# Patient Record
Sex: FEMALE | Race: WHITE | NOT HISPANIC OR LATINO
[De-identification: names, ages, dates, MRNs, and addresses within clinical notes are randomized per-mention and may not be internally consistent; named-entity substitution may affect disease eponyms.]

---

## 2017-01-03 ENCOUNTER — RX RENEWAL (OUTPATIENT)
Age: 57
End: 2017-01-03

## 2017-01-05 ENCOUNTER — NON-APPOINTMENT (OUTPATIENT)
Age: 57
End: 2017-01-05

## 2017-01-05 ENCOUNTER — APPOINTMENT (OUTPATIENT)
Dept: CARDIOLOGY | Facility: CLINIC | Age: 57
End: 2017-01-05

## 2017-01-05 VITALS — SYSTOLIC BLOOD PRESSURE: 174 MMHG | DIASTOLIC BLOOD PRESSURE: 100 MMHG

## 2017-01-05 VITALS
OXYGEN SATURATION: 95 % | HEIGHT: 63 IN | SYSTOLIC BLOOD PRESSURE: 145 MMHG | DIASTOLIC BLOOD PRESSURE: 80 MMHG | WEIGHT: 202 LBS | BODY MASS INDEX: 35.79 KG/M2 | HEART RATE: 83 BPM

## 2017-01-20 ENCOUNTER — APPOINTMENT (OUTPATIENT)
Dept: CARDIOLOGY | Facility: CLINIC | Age: 57
End: 2017-01-20

## 2017-01-23 ENCOUNTER — APPOINTMENT (OUTPATIENT)
Dept: CARDIOLOGY | Facility: CLINIC | Age: 57
End: 2017-01-23

## 2017-02-09 ENCOUNTER — APPOINTMENT (OUTPATIENT)
Dept: CARDIOLOGY | Facility: CLINIC | Age: 57
End: 2017-02-09

## 2017-06-18 ENCOUNTER — EMERGENCY (EMERGENCY)
Facility: HOSPITAL | Age: 57
LOS: 1 days | Discharge: HOME | End: 2017-06-18
Attending: EMERGENCY MEDICINE | Admitting: EMERGENCY MEDICINE
Payer: COMMERCIAL

## 2017-06-18 VITALS
HEART RATE: 103 BPM | WEIGHT: 197.09 LBS | TEMPERATURE: 98 F | DIASTOLIC BLOOD PRESSURE: 105 MMHG | SYSTOLIC BLOOD PRESSURE: 146 MMHG | RESPIRATION RATE: 18 BRPM | OXYGEN SATURATION: 99 % | HEIGHT: 63 IN

## 2017-06-18 VITALS
DIASTOLIC BLOOD PRESSURE: 71 MMHG | HEART RATE: 84 BPM | OXYGEN SATURATION: 100 % | RESPIRATION RATE: 16 BRPM | SYSTOLIC BLOOD PRESSURE: 142 MMHG

## 2017-06-18 DIAGNOSIS — E89.0 POSTPROCEDURAL HYPOTHYROIDISM: Chronic | ICD-10-CM

## 2017-06-18 DIAGNOSIS — Z98.891 HISTORY OF UTERINE SCAR FROM PREVIOUS SURGERY: Chronic | ICD-10-CM

## 2017-06-18 DIAGNOSIS — Z95.1 PRESENCE OF AORTOCORONARY BYPASS GRAFT: Chronic | ICD-10-CM

## 2017-06-18 LAB
ALBUMIN SERPL ELPH-MCNC: 4.3 G/DL — SIGNIFICANT CHANGE UP (ref 3.3–5)
ALP SERPL-CCNC: 98 U/L — SIGNIFICANT CHANGE UP (ref 30–120)
ALT FLD-CCNC: 53 U/L DA — SIGNIFICANT CHANGE UP (ref 10–60)
ANION GAP SERPL CALC-SCNC: 7 MMOL/L — SIGNIFICANT CHANGE UP (ref 5–17)
APPEARANCE UR: ABNORMAL
AST SERPL-CCNC: 22 U/L — SIGNIFICANT CHANGE UP (ref 10–40)
BASOPHILS # BLD AUTO: 0.1 K/UL — SIGNIFICANT CHANGE UP (ref 0–0.2)
BASOPHILS NFR BLD AUTO: 1.1 % — SIGNIFICANT CHANGE UP (ref 0–2)
BILIRUB SERPL-MCNC: 0.3 MG/DL — SIGNIFICANT CHANGE UP (ref 0.2–1.2)
BILIRUB UR-MCNC: NEGATIVE — SIGNIFICANT CHANGE UP
BUN SERPL-MCNC: 19 MG/DL — SIGNIFICANT CHANGE UP (ref 7–23)
CALCIUM SERPL-MCNC: 9.6 MG/DL — SIGNIFICANT CHANGE UP (ref 8.4–10.5)
CHLORIDE SERPL-SCNC: 104 MMOL/L — SIGNIFICANT CHANGE UP (ref 96–108)
CO2 SERPL-SCNC: 30 MMOL/L — SIGNIFICANT CHANGE UP (ref 22–31)
COLOR SPEC: YELLOW — SIGNIFICANT CHANGE UP
CREAT SERPL-MCNC: 1.01 MG/DL — SIGNIFICANT CHANGE UP (ref 0.5–1.3)
DIFF PNL FLD: ABNORMAL
EOSINOPHIL # BLD AUTO: 0.4 K/UL — SIGNIFICANT CHANGE UP (ref 0–0.5)
EOSINOPHIL NFR BLD AUTO: 3.5 % — SIGNIFICANT CHANGE UP (ref 0–6)
GLUCOSE SERPL-MCNC: 133 MG/DL — HIGH (ref 70–99)
GLUCOSE UR QL: NEGATIVE MG/DL — SIGNIFICANT CHANGE UP
HCT VFR BLD CALC: 46.3 % — HIGH (ref 34.5–45)
HGB BLD-MCNC: 17.6 G/DL — HIGH (ref 11.5–15.5)
KETONES UR-MCNC: NEGATIVE — SIGNIFICANT CHANGE UP
LEUKOCYTE ESTERASE UR-ACNC: ABNORMAL
LYMPHOCYTES # BLD AUTO: 29.4 % — SIGNIFICANT CHANGE UP (ref 13–44)
LYMPHOCYTES # BLD AUTO: 3.3 K/UL — SIGNIFICANT CHANGE UP (ref 1–3.3)
MCHC RBC-ENTMCNC: 34.8 PG — HIGH (ref 27–34)
MCHC RBC-ENTMCNC: 38 GM/DL — HIGH (ref 32–36)
MCV RBC AUTO: 91.4 FL — SIGNIFICANT CHANGE UP (ref 80–100)
MONOCYTES # BLD AUTO: 0.8 K/UL — SIGNIFICANT CHANGE UP (ref 0–0.9)
MONOCYTES NFR BLD AUTO: 6.9 % — SIGNIFICANT CHANGE UP (ref 2–14)
NEUTROPHILS # BLD AUTO: 6.7 K/UL — SIGNIFICANT CHANGE UP (ref 1.8–7.4)
NEUTROPHILS NFR BLD AUTO: 59.2 % — SIGNIFICANT CHANGE UP (ref 43–77)
NITRITE UR-MCNC: NEGATIVE — SIGNIFICANT CHANGE UP
PH UR: 5 — SIGNIFICANT CHANGE UP (ref 5–8)
PLATELET # BLD AUTO: 296 K/UL — SIGNIFICANT CHANGE UP (ref 150–400)
POTASSIUM SERPL-MCNC: 3.8 MMOL/L — SIGNIFICANT CHANGE UP (ref 3.5–5.3)
POTASSIUM SERPL-SCNC: 3.8 MMOL/L — SIGNIFICANT CHANGE UP (ref 3.5–5.3)
PROT SERPL-MCNC: 8 G/DL — SIGNIFICANT CHANGE UP (ref 6–8.3)
PROT UR-MCNC: 15 MG/DL
RBC # BLD: 5.06 M/UL — SIGNIFICANT CHANGE UP (ref 3.8–5.2)
RBC # FLD: 12.2 % — SIGNIFICANT CHANGE UP (ref 10.3–14.5)
SODIUM SERPL-SCNC: 141 MMOL/L — SIGNIFICANT CHANGE UP (ref 135–145)
SP GR SPEC: 1.02 — SIGNIFICANT CHANGE UP (ref 1.01–1.02)
UROBILINOGEN FLD QL: NEGATIVE MG/DL — SIGNIFICANT CHANGE UP
WBC # BLD: 11.3 K/UL — HIGH (ref 3.8–10.5)
WBC # FLD AUTO: 11.3 K/UL — HIGH (ref 3.8–10.5)

## 2017-06-18 PROCEDURE — 99284 EMERGENCY DEPT VISIT MOD MDM: CPT

## 2017-06-18 PROCEDURE — 99284 EMERGENCY DEPT VISIT MOD MDM: CPT | Mod: 25

## 2017-06-18 PROCEDURE — 80053 COMPREHEN METABOLIC PANEL: CPT

## 2017-06-18 PROCEDURE — 81001 URINALYSIS AUTO W/SCOPE: CPT

## 2017-06-18 PROCEDURE — 87086 URINE CULTURE/COLONY COUNT: CPT

## 2017-06-18 PROCEDURE — 74176 CT ABD & PELVIS W/O CONTRAST: CPT | Mod: 26

## 2017-06-18 PROCEDURE — 74176 CT ABD & PELVIS W/O CONTRAST: CPT

## 2017-06-18 PROCEDURE — 96374 THER/PROPH/DIAG INJ IV PUSH: CPT

## 2017-06-18 PROCEDURE — 85027 COMPLETE CBC AUTOMATED: CPT

## 2017-06-18 RX ORDER — SODIUM CHLORIDE 9 MG/ML
1000 INJECTION INTRAMUSCULAR; INTRAVENOUS; SUBCUTANEOUS ONCE
Qty: 0 | Refills: 0 | Status: COMPLETED | OUTPATIENT
Start: 2017-06-18 | End: 2017-06-18

## 2017-06-18 RX ORDER — KETOROLAC TROMETHAMINE 30 MG/ML
15 SYRINGE (ML) INJECTION ONCE
Qty: 0 | Refills: 0 | Status: DISCONTINUED | OUTPATIENT
Start: 2017-06-18 | End: 2017-06-18

## 2017-06-18 RX ORDER — SODIUM CHLORIDE 9 MG/ML
3 INJECTION INTRAMUSCULAR; INTRAVENOUS; SUBCUTANEOUS ONCE
Qty: 0 | Refills: 0 | Status: COMPLETED | OUTPATIENT
Start: 2017-06-18 | End: 2017-06-18

## 2017-06-18 RX ORDER — ACETAMINOPHEN 500 MG
650 TABLET ORAL ONCE
Qty: 0 | Refills: 0 | Status: DISCONTINUED | OUTPATIENT
Start: 2017-06-18 | End: 2017-06-22

## 2017-06-18 RX ADMIN — SODIUM CHLORIDE 3 MILLILITER(S): 9 INJECTION INTRAMUSCULAR; INTRAVENOUS; SUBCUTANEOUS at 02:11

## 2017-06-18 RX ADMIN — SODIUM CHLORIDE 1000 MILLILITER(S): 9 INJECTION INTRAMUSCULAR; INTRAVENOUS; SUBCUTANEOUS at 03:33

## 2017-06-18 RX ADMIN — Medication 15 MILLIGRAM(S): at 02:41

## 2017-06-18 RX ADMIN — SODIUM CHLORIDE 1000 MILLILITER(S): 9 INJECTION INTRAMUSCULAR; INTRAVENOUS; SUBCUTANEOUS at 02:18

## 2017-06-18 RX ADMIN — Medication 15 MILLIGRAM(S): at 02:18

## 2017-06-18 NOTE — ED PROVIDER NOTE - CHPI ED SYMPTOMS NEG
no vomiting/no hematuria/no burning urination/no chills/no dysuria/no diarrhea/no nausea/no fever/no blood in stool

## 2017-06-18 NOTE — ED ADULT NURSE NOTE - OBJECTIVE STATEMENT
58YO female walked in ED, pt c/o right flank pain. "it started out of no where" sharp 10/10 pain sale.

## 2017-06-18 NOTE — ED PROVIDER NOTE - MEDICAL DECISION MAKING DETAILS
57 y.o. F with acute onset right upper quadrant/flank pain 1 hour ago - ivf, pain meds, ct - renal stone vs gb

## 2017-06-18 NOTE — ED PROVIDER NOTE - PROGRESS NOTE DETAILS
no pain, discussed labs with pt (has been told about high hb previously - smoker, doesn't keep hydrated) - discussed ct - possibly biliary colic - lfts normal - no pain - offered to keep pt until am for US GB or as pain has resolved, advised pt could f/u pcp or surg and have outpt US GB for further workup - advised avoiding fat/greasy/fried foods - pt agrees, wants to go home - advised pt/family to return immediately for any worsening

## 2017-06-18 NOTE — ED PROVIDER NOTE - OBJECTIVE STATEMENT
57 y.o. F with acute onset 1 hr ago right flank pain, waxing and waning, severe, no nausea, no fever, no change urination, last BM yesterday morning, normal, last PO 3 hrs ago chicken/bread 57 y.o. F with acute onset 1 hr ago right flank pain, waxing and waning, severe, no nausea, no fever, no change urination, last BM yesterday morning, normal, last PO 3 hrs ago chicken with tahini and bread. took 1 motrin just pta, never had pain like this before

## 2017-06-19 LAB
CULTURE RESULTS: SIGNIFICANT CHANGE UP
SPECIMEN SOURCE: SIGNIFICANT CHANGE UP

## 2017-06-22 DIAGNOSIS — R10.9 UNSPECIFIED ABDOMINAL PAIN: ICD-10-CM

## 2017-12-20 ENCOUNTER — RX RENEWAL (OUTPATIENT)
Age: 57
End: 2017-12-20

## 2017-12-27 ENCOUNTER — EMERGENCY (EMERGENCY)
Facility: HOSPITAL | Age: 57
LOS: 1 days | Discharge: HOME | End: 2017-12-27
Attending: EMERGENCY MEDICINE | Admitting: EMERGENCY MEDICINE
Payer: COMMERCIAL

## 2017-12-27 VITALS
TEMPERATURE: 98 F | DIASTOLIC BLOOD PRESSURE: 86 MMHG | RESPIRATION RATE: 16 BRPM | OXYGEN SATURATION: 97 % | HEART RATE: 91 BPM | SYSTOLIC BLOOD PRESSURE: 143 MMHG

## 2017-12-27 VITALS
HEIGHT: 63 IN | SYSTOLIC BLOOD PRESSURE: 150 MMHG | TEMPERATURE: 97 F | OXYGEN SATURATION: 99 % | DIASTOLIC BLOOD PRESSURE: 83 MMHG | HEART RATE: 98 BPM | WEIGHT: 195.11 LBS | RESPIRATION RATE: 14 BRPM

## 2017-12-27 DIAGNOSIS — E89.0 POSTPROCEDURAL HYPOTHYROIDISM: Chronic | ICD-10-CM

## 2017-12-27 DIAGNOSIS — Z95.1 PRESENCE OF AORTOCORONARY BYPASS GRAFT: Chronic | ICD-10-CM

## 2017-12-27 DIAGNOSIS — Z98.891 HISTORY OF UTERINE SCAR FROM PREVIOUS SURGERY: Chronic | ICD-10-CM

## 2017-12-27 PROCEDURE — 73630 X-RAY EXAM OF FOOT: CPT

## 2017-12-27 PROCEDURE — 73630 X-RAY EXAM OF FOOT: CPT | Mod: 26,RT

## 2017-12-27 PROCEDURE — 99284 EMERGENCY DEPT VISIT MOD MDM: CPT | Mod: 25

## 2017-12-27 PROCEDURE — 70450 CT HEAD/BRAIN W/O DYE: CPT

## 2017-12-27 PROCEDURE — 70450 CT HEAD/BRAIN W/O DYE: CPT | Mod: 26

## 2017-12-27 PROCEDURE — 73610 X-RAY EXAM OF ANKLE: CPT

## 2017-12-27 PROCEDURE — 29515 APPLICATION SHORT LEG SPLINT: CPT | Mod: RT

## 2017-12-27 PROCEDURE — 29515 APPLICATION SHORT LEG SPLINT: CPT

## 2017-12-27 PROCEDURE — 73610 X-RAY EXAM OF ANKLE: CPT | Mod: 26,RT

## 2017-12-27 PROCEDURE — 99285 EMERGENCY DEPT VISIT HI MDM: CPT | Mod: 25

## 2017-12-27 RX ORDER — IBUPROFEN 200 MG
400 TABLET ORAL ONCE
Qty: 0 | Refills: 0 | Status: COMPLETED | OUTPATIENT
Start: 2017-12-27 | End: 2017-12-27

## 2017-12-27 RX ADMIN — Medication 400 MILLIGRAM(S): at 13:48

## 2017-12-27 RX ADMIN — Medication 400 MILLIGRAM(S): at 15:05

## 2017-12-27 NOTE — ED PROVIDER NOTE - PROGRESS NOTE DETAILS
patient resting comfortably, discussed, finding on head ct meningioma, advised follow up with her pmd and given name of neurologist Dr Adams,  discussed found to have comminuted calcaneal fx, splint applied, advised follow up with podiatrist, given information for Dr Travis, copy of results given

## 2017-12-27 NOTE — ED PROCEDURE NOTE - CPROC ED POST PROC CARE GUIDE1
Instructed patient/caregiver regarding signs and symptoms of infection./Elevate the injured extremity as instructed./Keep the cast/splint/dressing clean and dry./Instructed patient/caregiver to follow-up with primary care physician.

## 2017-12-27 NOTE — ED PROVIDER NOTE - MEDICAL DECISION MAKING DETAILS
mechanical fall, right foot, ankle injury, head injury, found to have calcaneal fx, pain med, splint,  found to have calcified meningioma on ct head, follow up with pmd, neuro, pmd

## 2017-12-27 NOTE — ED PROVIDER NOTE - PMH
Coronary artery disease    History of hyperlipidemia    History of hypertension    History of hypothyroidism

## 2017-12-27 NOTE — ED PROVIDER NOTE - OBJECTIVE STATEMENT
58 yo female accompanied to ed by her sister in law, presents with slip and fall off ladder at home today states she was painting her room, lost her balance and fell onto her right foot, states she then fell backwards and hit her head on the wall, denies loc.  denies headache, no neck pain, no nausea, no vomiting. PMD Dr River, No ortho  states she takes asa 81 mg daily

## 2017-12-27 NOTE — ED ADULT NURSE NOTE - OBJECTIVE STATEMENT
Patient was painting and fell off a step stool ladder 3 steps at home at 11:30 message left on machine to call md w/ any problems questions or concerns & for follow up appt. today. Patient has pain and swelling to right ankle and foot. Also hit the back of her head on the wall. Denies LOC. Ice in place to ankle/foot, patient c/o pain and numbness. Pedal pulse present and palpable. Toes warm and mobile.

## 2018-06-25 ENCOUNTER — RX RENEWAL (OUTPATIENT)
Age: 58
End: 2018-06-25

## 2018-07-05 ENCOUNTER — NON-APPOINTMENT (OUTPATIENT)
Age: 58
End: 2018-07-05

## 2018-07-05 ENCOUNTER — APPOINTMENT (OUTPATIENT)
Dept: CARDIOLOGY | Facility: CLINIC | Age: 58
End: 2018-07-05
Payer: COMMERCIAL

## 2018-07-05 VITALS
SYSTOLIC BLOOD PRESSURE: 137 MMHG | HEIGHT: 63 IN | OXYGEN SATURATION: 95 % | BODY MASS INDEX: 37.92 KG/M2 | HEART RATE: 85 BPM | WEIGHT: 214 LBS | DIASTOLIC BLOOD PRESSURE: 87 MMHG

## 2018-07-05 VITALS — DIASTOLIC BLOOD PRESSURE: 80 MMHG | SYSTOLIC BLOOD PRESSURE: 118 MMHG

## 2018-07-05 DIAGNOSIS — R60.0 LOCALIZED EDEMA: ICD-10-CM

## 2018-07-05 DIAGNOSIS — R07.89 OTHER CHEST PAIN: ICD-10-CM

## 2018-07-05 PROCEDURE — 99214 OFFICE O/P EST MOD 30 MIN: CPT

## 2018-07-05 PROCEDURE — 93000 ELECTROCARDIOGRAM COMPLETE: CPT

## 2018-07-05 RX ORDER — AMOXICILLIN AND CLAVULANATE POTASSIUM 875; 125 MG/1; MG/1
875-125 TABLET, COATED ORAL
Qty: 20 | Refills: 0 | Status: COMPLETED | COMMUNITY
Start: 2018-01-05

## 2018-07-05 RX ORDER — METOPROLOL SUCCINATE 25 MG/1
25 TABLET, EXTENDED RELEASE ORAL DAILY
Qty: 30 | Refills: 5 | Status: DISCONTINUED | COMMUNITY
Start: 2017-01-05 | End: 2018-07-05

## 2018-07-05 RX ORDER — LEVOTHYROXINE SODIUM 0.2 MG/1
200 TABLET ORAL
Qty: 48 | Refills: 0 | Status: ACTIVE | COMMUNITY
Start: 2018-03-23

## 2018-07-30 PROBLEM — Z86.39 PERSONAL HISTORY OF OTHER ENDOCRINE, NUTRITIONAL AND METABOLIC DISEASE: Chronic | Status: ACTIVE | Noted: 2017-12-27

## 2018-07-30 PROBLEM — I25.10 ATHEROSCLEROTIC HEART DISEASE OF NATIVE CORONARY ARTERY WITHOUT ANGINA PECTORIS: Chronic | Status: ACTIVE | Noted: 2017-06-18

## 2018-07-30 PROBLEM — Z86.79 PERSONAL HISTORY OF OTHER DISEASES OF THE CIRCULATORY SYSTEM: Chronic | Status: ACTIVE | Noted: 2017-12-27

## 2018-08-11 ENCOUNTER — APPOINTMENT (OUTPATIENT)
Dept: CARDIOLOGY | Facility: CLINIC | Age: 58
End: 2018-08-11
Payer: COMMERCIAL

## 2018-08-11 PROCEDURE — 93970 EXTREMITY STUDY: CPT

## 2018-08-22 ENCOUNTER — APPOINTMENT (OUTPATIENT)
Dept: CARDIOLOGY | Facility: CLINIC | Age: 58
End: 2018-08-22
Payer: COMMERCIAL

## 2018-08-22 PROCEDURE — 93306 TTE W/DOPPLER COMPLETE: CPT

## 2018-10-22 NOTE — ED ADULT NURSE NOTE - PMH
ergocalciferol (ERGOCALCIFEROL) 05671 units capsule  Requested Prescriptions  Last Written Prescription Date:  8/16/18  Last Fill Quantity: 8,  # refills: 0   Last office visit: 10/19/2018 with prescribing provider:     Future Office Visit:   Next 5 appointments (look out 90 days)     Nov 12, 2018 10:00 AM CST   Return Visit with JUNIOR Bishop CNP   [facility] ([facility])    [address]  [address]  [facility] [address]   [phone]            Nov 12, 2018 10:00 AM CST   Return Visit with LAMINE Chahal   [facility] ([facility])    [address]  [address]  [facility] [address]   [phone]                    Pending Prescriptions Disp Refills     ergocalciferol (ERGOCALCIFEROL) 00206 units capsule 8 capsule 0     Sig: Take 1 capsule (50,000 Units) by mouth once a week    There is no refill protocol information for this order           Coronary artery disease    History of hyperlipidemia    History of hypertension    History of hypothyroidism

## 2019-02-21 ENCOUNTER — RX RENEWAL (OUTPATIENT)
Age: 59
End: 2019-02-21

## 2020-12-07 ENCOUNTER — NON-APPOINTMENT (OUTPATIENT)
Age: 60
End: 2020-12-07

## 2020-12-07 ENCOUNTER — APPOINTMENT (OUTPATIENT)
Dept: CARDIOLOGY | Facility: CLINIC | Age: 60
End: 2020-12-07
Payer: COMMERCIAL

## 2020-12-07 VITALS
OXYGEN SATURATION: 94 % | WEIGHT: 230 LBS | HEART RATE: 79 BPM | BODY MASS INDEX: 40.75 KG/M2 | HEIGHT: 63 IN | SYSTOLIC BLOOD PRESSURE: 137 MMHG | DIASTOLIC BLOOD PRESSURE: 85 MMHG

## 2020-12-07 VITALS — SYSTOLIC BLOOD PRESSURE: 118 MMHG | DIASTOLIC BLOOD PRESSURE: 70 MMHG

## 2020-12-07 DIAGNOSIS — R07.9 CHEST PAIN, UNSPECIFIED: ICD-10-CM

## 2020-12-07 PROCEDURE — 99072 ADDL SUPL MATRL&STAF TM PHE: CPT

## 2020-12-07 PROCEDURE — 99215 OFFICE O/P EST HI 40 MIN: CPT

## 2020-12-07 PROCEDURE — 93000 ELECTROCARDIOGRAM COMPLETE: CPT

## 2020-12-07 RX ORDER — NICOTINE 21 MG/24HR
21 PATCH, TRANSDERMAL 24 HOURS TRANSDERMAL DAILY
Qty: 1 | Refills: 5 | Status: ACTIVE | COMMUNITY
Start: 2020-12-07 | End: 1900-01-01

## 2020-12-07 NOTE — DISCUSSION/SUMMARY
[FreeTextEntry1] : 60-year-old woman with a history as listed above who presents today for a followup evaluation.\par Ani is complaining of more anginal symptoms. Her ekg now shows more TWI anteriorly. She will get a 2d echo to assess for any  new structural heart disease, changes in valvular and ventricular function. She will need a coronary angiogram. Her blood pressure is controlled today on exam. She will continue Losartan 50mg Qday. She did not tolerate Torpol 25mg Qday. She try Ranexa 500mg Q12. \par She will continue with aspirin 81 mg daily.\par Her lower extremity edema is likely from her recent trauma and venous insufficiency. She will get a lower extremity duplex. \par She will continue with Crestor 10mg HS. \par At your convenience, please fax me her latest lab results including lipid profile. \par Exercise and diet counseling was performed in order to reduce her future cardiovascular risk.\par She will followup with me in 6 months or sooner if necessary. She will follow up with you for all of her other medical needs.

## 2020-12-07 NOTE — PHYSICAL EXAM
[Well Groomed] : well groomed [General Appearance - In No Acute Distress] : no acute distress [Normal Conjunctiva] : the conjunctiva exhibited no abnormalities [Eyelids - No Xanthelasma] : the eyelids demonstrated no xanthelasmas [Normal Oral Mucosa] : normal oral mucosa [No Oral Pallor] : no oral pallor [No Oral Cyanosis] : no oral cyanosis [Normal Jugular Venous A Waves Present] : normal jugular venous A waves present [Normal Jugular Venous V Waves Present] : normal jugular venous V waves present [No Jugular Venous Diana A Waves] : no jugular venous diana A waves [Respiration, Rhythm And Depth] : normal respiratory rhythm and effort [Exaggerated Use Of Accessory Muscles For Inspiration] : no accessory muscle use [Auscultation Breath Sounds / Voice Sounds] : lungs were clear to auscultation bilaterally [Abdomen Soft] : soft [Abdomen Tenderness] : non-tender [Abdomen Mass (___ Cm)] : no abdominal mass palpated [Abnormal Walk] : normal gait [Gait - Sufficient For Exercise Testing] : the gait was sufficient for exercise testing [Nail Clubbing] : no clubbing of the fingernails [Cyanosis, Localized] : no localized cyanosis [Petechial Hemorrhages (___cm)] : no petechial hemorrhages [Skin Color & Pigmentation] : normal skin color and pigmentation [] : no rash [No Venous Stasis] : no venous stasis [Skin Lesions] : no skin lesions [No Skin Ulcers] : no skin ulcer [No Xanthoma] : no  xanthoma was observed [Oriented To Time, Place, And Person] : oriented to person, place, and time [Affect] : the affect was normal [Mood] : the mood was normal [No Anxiety] : not feeling anxious [Normal Rate] : normal [Rhythm Regular] : regular [Normal S1] : normal S1 [Normal S2] : normal S2 [No Gallop] : no gallop heard [I] : a grade 1 [2+] : left 2+ [Right Carotid Bruit] : no bruit heard over the right carotid [Left Carotid Bruit] : no bruit heard over the left carotid [1+] : left 1+ [No Abnormalities] : the abdominal aorta was not enlarged and no bruit was heard [Bruit] : no bruit heard [___+] : [unfilled]U+ pretibial pitting edema on the right [Rt] : no varicose veins of the right leg [Lt] : no varicose veins of the left leg

## 2020-12-07 NOTE — HISTORY OF PRESENT ILLNESS
[FreeTextEntry1] : 60 year old with HLD, HTN, CAD s/p CABG, overweight, chronic active tobacco use who presents for an followup visit.\par \par She has not been to the office since 7/2018\par She has been complaining of more dyspnea on exertion after walking about 0.25 mile. She also complains of mild chest pressure when carrying load and walking. Her symptoms will improve with slowing down or stopping. \par She has noted that she has gained weight during the pandemic. She has been more sedentary. Currently, she denies any  PND, orthopnea, palpitations, near syncope, syncope.  She is compliant with her medications.  Though she stopped the Toprol for sluggishness. She also is smoking ~1PPD\par

## 2020-12-14 DIAGNOSIS — Z01.818 ENCOUNTER FOR OTHER PREPROCEDURAL EXAMINATION: ICD-10-CM

## 2020-12-15 ENCOUNTER — APPOINTMENT (OUTPATIENT)
Dept: DISASTER EMERGENCY | Facility: CLINIC | Age: 60
End: 2020-12-15

## 2020-12-17 ENCOUNTER — FORM ENCOUNTER (OUTPATIENT)
Age: 60
End: 2020-12-17

## 2020-12-17 LAB — SARS-COV-2 N GENE NPH QL NAA+PROBE: NOT DETECTED

## 2020-12-18 ENCOUNTER — INPATIENT (INPATIENT)
Facility: HOSPITAL | Age: 60
LOS: 0 days | Discharge: HOME | DRG: 247 | End: 2020-12-19
Attending: INTERNAL MEDICINE | Admitting: INTERNAL MEDICINE
Payer: COMMERCIAL

## 2020-12-18 ENCOUNTER — TRANSCRIPTION ENCOUNTER (OUTPATIENT)
Age: 60
End: 2020-12-18

## 2020-12-18 VITALS
SYSTOLIC BLOOD PRESSURE: 146 MMHG | DIASTOLIC BLOOD PRESSURE: 80 MMHG | OXYGEN SATURATION: 97 % | WEIGHT: 225.09 LBS | HEIGHT: 63 IN | HEART RATE: 83 BPM | RESPIRATION RATE: 16 BRPM | TEMPERATURE: 98 F

## 2020-12-18 DIAGNOSIS — E89.0 POSTPROCEDURAL HYPOTHYROIDISM: Chronic | ICD-10-CM

## 2020-12-18 DIAGNOSIS — Z98.891 HISTORY OF UTERINE SCAR FROM PREVIOUS SURGERY: Chronic | ICD-10-CM

## 2020-12-18 DIAGNOSIS — Z95.1 PRESENCE OF AORTOCORONARY BYPASS GRAFT: Chronic | ICD-10-CM

## 2020-12-18 DIAGNOSIS — R07.9 CHEST PAIN, UNSPECIFIED: ICD-10-CM

## 2020-12-18 LAB
ALBUMIN SERPL ELPH-MCNC: 4.5 G/DL — SIGNIFICANT CHANGE UP (ref 3.3–5)
ALP SERPL-CCNC: 81 U/L — SIGNIFICANT CHANGE UP (ref 40–120)
ALT FLD-CCNC: 44 U/L — SIGNIFICANT CHANGE UP (ref 10–45)
ANION GAP SERPL CALC-SCNC: 13 MMOL/L — SIGNIFICANT CHANGE UP (ref 5–17)
AST SERPL-CCNC: 25 U/L — SIGNIFICANT CHANGE UP (ref 10–40)
BILIRUB SERPL-MCNC: 0.3 MG/DL — SIGNIFICANT CHANGE UP (ref 0.2–1.2)
BUN SERPL-MCNC: 11 MG/DL — SIGNIFICANT CHANGE UP (ref 7–23)
CALCIUM SERPL-MCNC: 9.5 MG/DL — SIGNIFICANT CHANGE UP (ref 8.4–10.5)
CHLORIDE SERPL-SCNC: 102 MMOL/L — SIGNIFICANT CHANGE UP (ref 96–108)
CO2 SERPL-SCNC: 24 MMOL/L — SIGNIFICANT CHANGE UP (ref 22–31)
CREAT SERPL-MCNC: 0.74 MG/DL — SIGNIFICANT CHANGE UP (ref 0.5–1.3)
GLUCOSE SERPL-MCNC: 114 MG/DL — HIGH (ref 70–99)
HCT VFR BLD CALC: 49.4 % — HIGH (ref 34.5–45)
HGB BLD-MCNC: 17.5 G/DL — HIGH (ref 11.5–15.5)
MCHC RBC-ENTMCNC: 32.4 PG — SIGNIFICANT CHANGE UP (ref 27–34)
MCHC RBC-ENTMCNC: 35.4 GM/DL — SIGNIFICANT CHANGE UP (ref 32–36)
MCV RBC AUTO: 91.5 FL — SIGNIFICANT CHANGE UP (ref 80–100)
NRBC # BLD: 0 /100 WBCS — SIGNIFICANT CHANGE UP (ref 0–0)
PLATELET # BLD AUTO: 284 K/UL — SIGNIFICANT CHANGE UP (ref 150–400)
POTASSIUM SERPL-MCNC: 4.2 MMOL/L — SIGNIFICANT CHANGE UP (ref 3.5–5.3)
POTASSIUM SERPL-SCNC: 4.2 MMOL/L — SIGNIFICANT CHANGE UP (ref 3.5–5.3)
PROT SERPL-MCNC: 7.1 G/DL — SIGNIFICANT CHANGE UP (ref 6–8.3)
RBC # BLD: 5.4 M/UL — HIGH (ref 3.8–5.2)
RBC # FLD: 13.1 % — SIGNIFICANT CHANGE UP (ref 10.3–14.5)
SODIUM SERPL-SCNC: 139 MMOL/L — SIGNIFICANT CHANGE UP (ref 135–145)
WBC # BLD: 9.96 K/UL — SIGNIFICANT CHANGE UP (ref 3.8–10.5)
WBC # FLD AUTO: 9.96 K/UL — SIGNIFICANT CHANGE UP (ref 3.8–10.5)

## 2020-12-18 PROCEDURE — 99221 1ST HOSP IP/OBS SF/LOW 40: CPT

## 2020-12-18 PROCEDURE — 92928 PRQ TCAT PLMT NTRAC ST 1 LES: CPT | Mod: LC

## 2020-12-18 PROCEDURE — 93459 L HRT ART/GRFT ANGIO: CPT | Mod: 26,59

## 2020-12-18 PROCEDURE — 93567 NJX CAR CTH SPRVLV AORTGRPHY: CPT

## 2020-12-18 PROCEDURE — 99152 MOD SED SAME PHYS/QHP 5/>YRS: CPT

## 2020-12-18 PROCEDURE — 93571 IV DOP VEL&/PRESS C FLO 1ST: CPT | Mod: 26,LC

## 2020-12-18 PROCEDURE — 93010 ELECTROCARDIOGRAM REPORT: CPT

## 2020-12-18 RX ORDER — ACETAMINOPHEN 500 MG
1000 TABLET ORAL ONCE
Refills: 0 | Status: COMPLETED | OUTPATIENT
Start: 2020-12-18 | End: 2020-12-18

## 2020-12-18 RX ORDER — RANOLAZINE 500 MG/1
1 TABLET, FILM COATED, EXTENDED RELEASE ORAL
Qty: 0 | Refills: 0 | DISCHARGE
Start: 2020-12-18

## 2020-12-18 RX ORDER — ROSUVASTATIN CALCIUM 5 MG/1
1 TABLET ORAL
Qty: 0 | Refills: 0 | DISCHARGE

## 2020-12-18 RX ORDER — RANOLAZINE 500 MG/1
500 TABLET, FILM COATED, EXTENDED RELEASE ORAL
Refills: 0 | Status: DISCONTINUED | OUTPATIENT
Start: 2020-12-18 | End: 2020-12-19

## 2020-12-18 RX ORDER — NICOTINE POLACRILEX 2 MG
1 GUM BUCCAL
Qty: 14 | Refills: 0
Start: 2020-12-18 | End: 2020-12-31

## 2020-12-18 RX ORDER — CLOPIDOGREL BISULFATE 75 MG/1
75 TABLET, FILM COATED ORAL DAILY
Refills: 0 | Status: DISCONTINUED | OUTPATIENT
Start: 2020-12-19 | End: 2020-12-19

## 2020-12-18 RX ORDER — LANOLIN ALCOHOL/MO/W.PET/CERES
5 CREAM (GRAM) TOPICAL AT BEDTIME
Refills: 0 | Status: DISCONTINUED | OUTPATIENT
Start: 2020-12-18 | End: 2020-12-19

## 2020-12-18 RX ORDER — RANOLAZINE 500 MG/1
1 TABLET, FILM COATED, EXTENDED RELEASE ORAL
Qty: 0 | Refills: 0 | DISCHARGE

## 2020-12-18 RX ORDER — NICOTINE POLACRILEX 2 MG
2 GUM BUCCAL
Refills: 0 | Status: DISCONTINUED | OUTPATIENT
Start: 2020-12-18 | End: 2020-12-19

## 2020-12-18 RX ORDER — LEVOTHYROXINE SODIUM 125 MCG
1 TABLET ORAL
Qty: 0 | Refills: 0 | DISCHARGE

## 2020-12-18 RX ORDER — LOSARTAN POTASSIUM 100 MG/1
50 TABLET, FILM COATED ORAL DAILY
Refills: 0 | Status: DISCONTINUED | OUTPATIENT
Start: 2020-12-18 | End: 2020-12-19

## 2020-12-18 RX ORDER — LEVOTHYROXINE SODIUM 125 MCG
200 TABLET ORAL DAILY
Refills: 0 | Status: DISCONTINUED | OUTPATIENT
Start: 2020-12-18 | End: 2020-12-19

## 2020-12-18 RX ORDER — CLOPIDOGREL BISULFATE 75 MG/1
1 TABLET, FILM COATED ORAL
Qty: 90 | Refills: 3
Start: 2020-12-18 | End: 2021-12-12

## 2020-12-18 RX ORDER — NICOTINE POLACRILEX 2 MG
1 GUM BUCCAL DAILY
Refills: 0 | Status: DISCONTINUED | OUTPATIENT
Start: 2020-12-18 | End: 2020-12-19

## 2020-12-18 RX ORDER — ROSUVASTATIN CALCIUM 5 MG/1
10 TABLET ORAL AT BEDTIME
Refills: 0 | Status: DISCONTINUED | OUTPATIENT
Start: 2020-12-18 | End: 2020-12-19

## 2020-12-18 RX ORDER — ASPIRIN/CALCIUM CARB/MAGNESIUM 324 MG
81 TABLET ORAL DAILY
Refills: 0 | Status: DISCONTINUED | OUTPATIENT
Start: 2020-12-18 | End: 2020-12-19

## 2020-12-18 RX ADMIN — ROSUVASTATIN CALCIUM 10 MILLIGRAM(S): 5 TABLET ORAL at 22:57

## 2020-12-18 RX ADMIN — Medication 200 MICROGRAM(S): at 15:57

## 2020-12-18 RX ADMIN — RANOLAZINE 500 MILLIGRAM(S): 500 TABLET, FILM COATED, EXTENDED RELEASE ORAL at 15:34

## 2020-12-18 RX ADMIN — Medication 81 MILLIGRAM(S): at 15:58

## 2020-12-18 RX ADMIN — Medication 1000 MILLIGRAM(S): at 15:05

## 2020-12-18 RX ADMIN — Medication 1 PATCH: at 18:51

## 2020-12-18 RX ADMIN — Medication 1000 MILLIGRAM(S): at 15:35

## 2020-12-18 RX ADMIN — Medication 5 MILLIGRAM(S): at 22:57

## 2020-12-18 RX ADMIN — LOSARTAN POTASSIUM 50 MILLIGRAM(S): 100 TABLET, FILM COATED ORAL at 15:33

## 2020-12-18 NOTE — CHART NOTE - NSCHARTNOTEFT_GEN_A_CORE
Patient underwent a PCI procedure and is being admitted as they are at increased risk for major adverse cardiac and vascular events if discharged due to the following high risk characteristics:  s/p pCx stent via RFA admitted for Inability to ambulate due to poor coordination, vasomotor instability, dizziness, or suspected neurologic issues or events

## 2020-12-18 NOTE — DISCHARGE NOTE PROVIDER - NSDCFUADDINST_GEN_ALL_CORE_FT

## 2020-12-18 NOTE — H&P CARDIOLOGY - HISTORY OF PRESENT ILLNESS
61 y/o female PMH CAD s/p CABG, HTN, HLD, overweight, current smoker, with c/o SCHAFFER after walking about 0.25 miles. She also c/o mild chest pressure when carrying load and walking. Symptoms improve with slowing down or stopping. Seen and evaluated by cardiologist, Dr. Selina Gonzalez, and noted to have more TWI anteriorly on EKG. Referred for C today.  61 y/o female PMH CAD s/p 3v CABG ([place], 2004), HTN, HLD, overweight, current smoker, with c/o SCHAFFER after walking about 0.25 miles. She also c/o mild chest pressure when carrying load and walking. Symptoms improve with slowing down or stopping. Seen and evaluated by cardiologist, Dr. Selina Gonzalez, and noted to have more TWI anteriorly on EKG. Referred for [facility] today.

## 2020-12-18 NOTE — CHART NOTE - NSCHARTNOTEFT_GEN_A_CORE
Removal of Femoral Sheath    Pulses in the right lower extremity are palpable.   The patient was placed in the supine position. The insertion site was identified and the sutures were removed per protocol.    The __6__ [place] femoral sheath was then removed.   Direct pressure was applied for  __20____ minutes.   Complications: None, VSS, Good Hemostasis.     Monitoring of the right groin and both lower extremity including neuro-vascular checks and vital signs every 15 minutes x 4, then every 30 minutes x 2, then every 1 hour was ordered.    Discharge Instruction discussed with patient: ASA, Plavix, statin, diet, activities, access site care, follow up care, reportable signs and symptoms.     A/P   HPI:  61 y/o female PMH CAD s/p 3v CABG ([place], 2004), HTN, HLD, overweight, current smoker, with c/o SCHAFFER after walking about 0.25 miles. She also c/o mild chest pressure when carrying load and walking. Symptoms improve with slowing down or stopping. Seen and evaluated by cardiologist, Dr. Selina Gonzalez, and noted to have more TWI anteriorly on EKG. Referred for [facility] today.  (18 Dec 2020 10:58)      Plan: continue to monitor, Continue ASA, Plavix, Statin,   Smoking cessation - Nicotine 21mg Patch and Nicotine 2mg lozenge q2h prn  discharge home in am if stable.  Pt will follow up with his/her cardiologist in 1-2weeks with Dr. Selina Arreola, [facility]  Cardiology

## 2020-12-18 NOTE — DISCHARGE NOTE PROVIDER - NSDCMRMEDTOKEN_GEN_ALL_CORE_FT
Aspirin Enteric Coated 81 mg oral delayed release tablet: 1 tab(s) orally once a day  Crestor 10 mg oral tablet: 1 tab(s) orally once a day  levothyroxine 200 mcg (0.2 mg) oral tablet: 1 tab(s) orally once a day  losartan 50 mg oral tablet: 1 tab(s) orally once a day  ranolazine 500 mg oral tablet, extended release: 1 tab(s) orally 2 times a day PT HAS NOT STARTED YET   Aspirin Enteric Coated 81 mg oral delayed release tablet: 1 tab(s) orally once a day  clopidogrel 75 mg oral tablet: 1 tab(s) orally once a day  Crestor 10 mg oral tablet: 1 tab(s) orally once a day  levothyroxine 200 mcg (0.2 mg) oral tablet: 1 tab(s) orally once a day  losartan 50 mg oral tablet: 1 tab(s) orally once a day  nicotine 21 mg-14 mg-7 mg transdermal film, extended release: 1 each transdermal once a day  ranolazine 500 mg oral tablet, extended release: 1 tab(s) orally 2 times a day   Aspirin Enteric Coated 81 mg oral delayed release tablet: 1 tab(s) orally once a day  clopidogrel 75 mg oral tablet: 1 tab(s) orally once a day  Crestor 10 mg oral tablet: 1 tab(s) orally once a day  levothyroxine 200 mcg (0.2 mg) oral tablet: 1 tab(s) orally once a day  losartan 50 mg oral tablet: 2 tab(s) orally once a day  nicotine 21 mg-14 mg-7 mg transdermal film, extended release: 1 each transdermal once a day  ranolazine 500 mg oral tablet, extended release: 1 tab(s) orally 2 times a day

## 2020-12-18 NOTE — DISCHARGE NOTE PROVIDER - NSDCFUSCHEDAPPT_GEN_ALL_CORE_FT
MATTHEW EASLEY ; 12/30/2020 ; [facility] [address]  MATTHEW EASLEY ; 01/14/2021 ; [facility] [address]

## 2020-12-18 NOTE — DISCHARGE NOTE PROVIDER - CARE PROVIDER_API CALL
Selina Gonzalez  INTERNAL MEDICINE  [address] [place] [id number]  Phone: [phone]  Fax: [phone]  Established Patient  Follow Up Time: 2 weeks

## 2020-12-18 NOTE — DISCHARGE NOTE PROVIDER - NSDCCPCAREPLAN_GEN_ALL_CORE_FT
PRINCIPAL DISCHARGE DIAGNOSIS  Diagnosis: CAD S/P percutaneous coronary angioplasty  Assessment and Plan of Treatment: s/p stent to proximal circumflex artery. Continue aspirin and palvix .

## 2020-12-18 NOTE — DISCHARGE NOTE PROVIDER - HOSPITAL COURSE
59 y/o female PMH CAD s/p 3v CABG ([place], 2004), HTN, HLD, overweight, current smoker, with c/o SCHAFFER after walking about 0.25 miles. She also c/o mild chest pressure when carrying load and walking. Symptoms improve with slowing down or stopping. Seen and evaluated by cardiologist, Dr. Selina Gonzalez, and noted to have more TWI anteriorly on EKG. Referred for [facility] . She is s/p PCI / LANE to pCx 70% , + IFR .   pLAD  with  LIMA to Left anterior descending artery patent , pRCA with SVG to RCA and AVG to OM down . Patient on aspirin and plavix and will continue crestor .

## 2020-12-18 NOTE — H&P CARDIOLOGY - TOBACCO USE
Patient did not tolerate trazodone, and has discontinued it.  She continues to tolerate sertraline.  She reports no change in her symptoms of depression.  Denies suicidal or homicidal ideation.  She has scheduled a water aerobics class to begin in June which she is excited about.  We also discussed cognitive behavioral therapy, which I recommended again.  She will continue to consider.   Current every day smoker

## 2020-12-18 NOTE — DISCHARGE NOTE PROVIDER - NSDCFUADDAPPT_GEN_ALL_CORE_FT
Please see pre printed discharge instructions sheet.  Please see pre printed discharge instructions sheet.   follow up with Dr Gonzalez within 1-2 weeks

## 2020-12-19 ENCOUNTER — TRANSCRIPTION ENCOUNTER (OUTPATIENT)
Age: 60
End: 2020-12-19

## 2020-12-19 VITALS
OXYGEN SATURATION: 95 % | HEART RATE: 101 BPM | SYSTOLIC BLOOD PRESSURE: 159 MMHG | DIASTOLIC BLOOD PRESSURE: 89 MMHG | RESPIRATION RATE: 16 BRPM

## 2020-12-19 LAB
ANION GAP SERPL CALC-SCNC: 15 MMOL/L — SIGNIFICANT CHANGE UP (ref 5–17)
BUN SERPL-MCNC: 15 MG/DL — SIGNIFICANT CHANGE UP (ref 7–23)
CALCIUM SERPL-MCNC: 9.3 MG/DL — SIGNIFICANT CHANGE UP (ref 8.4–10.5)
CHLORIDE SERPL-SCNC: 99 MMOL/L — SIGNIFICANT CHANGE UP (ref 96–108)
CO2 SERPL-SCNC: 23 MMOL/L — SIGNIFICANT CHANGE UP (ref 22–31)
CREAT SERPL-MCNC: 0.77 MG/DL — SIGNIFICANT CHANGE UP (ref 0.5–1.3)
GLUCOSE SERPL-MCNC: 156 MG/DL — HIGH (ref 70–99)
HCT VFR BLD CALC: 48.2 % — HIGH (ref 34.5–45)
HGB BLD-MCNC: 16.3 G/DL — HIGH (ref 11.5–15.5)
MCHC RBC-ENTMCNC: 31.2 PG — SIGNIFICANT CHANGE UP (ref 27–34)
MCHC RBC-ENTMCNC: 33.8 GM/DL — SIGNIFICANT CHANGE UP (ref 32–36)
MCV RBC AUTO: 92.2 FL — SIGNIFICANT CHANGE UP (ref 80–100)
NRBC # BLD: 0 /100 WBCS — SIGNIFICANT CHANGE UP (ref 0–0)
PLATELET # BLD AUTO: 263 K/UL — SIGNIFICANT CHANGE UP (ref 150–400)
POTASSIUM SERPL-MCNC: 4 MMOL/L — SIGNIFICANT CHANGE UP (ref 3.5–5.3)
POTASSIUM SERPL-SCNC: 4 MMOL/L — SIGNIFICANT CHANGE UP (ref 3.5–5.3)
RBC # BLD: 5.23 M/UL — HIGH (ref 3.8–5.2)
RBC # FLD: 12.8 % — SIGNIFICANT CHANGE UP (ref 10.3–14.5)
SODIUM SERPL-SCNC: 137 MMOL/L — SIGNIFICANT CHANGE UP (ref 135–145)
WBC # BLD: 13.14 K/UL — HIGH (ref 3.8–10.5)
WBC # FLD AUTO: 13.14 K/UL — HIGH (ref 3.8–10.5)

## 2020-12-19 PROCEDURE — 99153 MOD SED SAME PHYS/QHP EA: CPT

## 2020-12-19 PROCEDURE — 93010 ELECTROCARDIOGRAM REPORT: CPT

## 2020-12-19 PROCEDURE — C1874: CPT

## 2020-12-19 PROCEDURE — 93005 ELECTROCARDIOGRAM TRACING: CPT

## 2020-12-19 PROCEDURE — 99152 MOD SED SAME PHYS/QHP 5/>YRS: CPT

## 2020-12-19 PROCEDURE — C1753: CPT

## 2020-12-19 PROCEDURE — C1725: CPT

## 2020-12-19 PROCEDURE — 93571 IV DOP VEL&/PRESS C FLO 1ST: CPT | Mod: LC

## 2020-12-19 PROCEDURE — C9600: CPT | Mod: LC

## 2020-12-19 PROCEDURE — 80048 BASIC METABOLIC PNL TOTAL CA: CPT

## 2020-12-19 PROCEDURE — 99223 1ST HOSP IP/OBS HIGH 75: CPT

## 2020-12-19 PROCEDURE — C1769: CPT

## 2020-12-19 PROCEDURE — 80053 COMPREHEN METABOLIC PANEL: CPT

## 2020-12-19 PROCEDURE — 93459 L HRT ART/GRFT ANGIO: CPT | Mod: 59

## 2020-12-19 PROCEDURE — 93567 NJX CAR CTH SPRVLV AORTGRPHY: CPT

## 2020-12-19 PROCEDURE — C1887: CPT

## 2020-12-19 PROCEDURE — 85027 COMPLETE CBC AUTOMATED: CPT

## 2020-12-19 PROCEDURE — C1894: CPT

## 2020-12-19 PROCEDURE — 99238 HOSP IP/OBS DSCHRG MGMT 30/<: CPT

## 2020-12-19 RX ORDER — LOSARTAN POTASSIUM 100 MG/1
1 TABLET, FILM COATED ORAL
Qty: 0 | Refills: 0 | DISCHARGE

## 2020-12-19 RX ORDER — LOSARTAN POTASSIUM 100 MG/1
50 TABLET, FILM COATED ORAL ONCE
Refills: 0 | Status: COMPLETED | OUTPATIENT
Start: 2020-12-19 | End: 2020-12-19

## 2020-12-19 RX ORDER — LOSARTAN POTASSIUM 100 MG/1
2 TABLET, FILM COATED ORAL
Qty: 60 | Refills: 0
Start: 2020-12-19 | End: 2021-01-17

## 2020-12-19 RX ADMIN — LOSARTAN POTASSIUM 50 MILLIGRAM(S): 100 TABLET, FILM COATED ORAL at 08:03

## 2020-12-19 RX ADMIN — RANOLAZINE 500 MILLIGRAM(S): 500 TABLET, FILM COATED, EXTENDED RELEASE ORAL at 06:34

## 2020-12-19 RX ADMIN — CLOPIDOGREL BISULFATE 75 MILLIGRAM(S): 75 TABLET, FILM COATED ORAL at 06:34

## 2020-12-19 RX ADMIN — LOSARTAN POTASSIUM 50 MILLIGRAM(S): 100 TABLET, FILM COATED ORAL at 06:34

## 2020-12-19 RX ADMIN — Medication 81 MILLIGRAM(S): at 06:34

## 2020-12-19 RX ADMIN — Medication 1 PATCH: at 06:33

## 2020-12-19 RX ADMIN — Medication 200 MICROGRAM(S): at 06:34

## 2020-12-19 NOTE — CONSULT NOTE ADULT - SUBJECTIVE AND OBJECTIVE BOX
· Subjective and Objective:   [facility] CARDIOLOGY CONSULTANTS:    Enzo Long, Tri, Dannie, Carlos Mcdermott, Sona Mitchell      [phone]    CHIEF COMPLAINT: Patient is a 60y old  Female who presents with a chief complaint of LHC  (18 Dec 2020 21:02)    61 y/o female PMH CAD s/p 3v CABG ([place], ), HTN, HLD, overweight, current smoker, with c/o SCHAFFER after walking about 0.25 miles. She also c/o mild chest pressure when carrying load and walking. Symptoms improve with slowing down or stopping. Seen and evaluated by cardiologist, Dr. Selina Gonzalez, and noted to have more TWI anteriorly on EKG. Referred for LHC.  She is now s/p PCI to Cx.       INTERIM HISTORY: Pt has no complaints today, there were no overnight events.  She denies CP, SOB, LE edema, palpitations.  She notes some upper back pain from bed       TELEMETRY: NSR/sinus tach    ROS otherwise negative unless noted        PAST MEDICAL & SURGICAL HISTORY:  History of hypothyroidism    History of hyperlipidemia    History of hypertension    Coronary artery disease    H/O:     Status post three vessel coronary artery bypass    H/O thyroidectomy        MEDICATIONS  (STANDING):  aspirin enteric coated 81 milliGRAM(s) Oral daily  clopidogrel Tablet 75 milliGRAM(s) Oral daily  levothyroxine 200 MICROGram(s) Oral daily  losartan 50 milliGRAM(s) Oral daily  nicotine - 21 mG/24Hr(s) Patch 1 patch Transdermal daily  ranolazine 500 milliGRAM(s) Oral two times a day  rosuvastatin 10 milliGRAM(s) Oral at bedtime      Allergies    No Known Allergies    Intolerances    Toprol-XL (Other)                            16.3   13.14 )-----------( 263      ( 19 Dec 2020 04:27 )             48.2       12-19    137  |  99  |  15  ----------------------------<  156<H>  4.0   |  23  |  0.77    Ca    9.3      19 Dec 2020 04:27    TPro  7.1  /  Alb  4.5  /  TBili  0.3  /  DBili  x   /  AST  25  /  ALT  44  /  AlkPhos  81  12-18      LIVER FUNCTIONS - ( 18 Dec 2020 11:06 )  Alb: 4.5 g/dL / Pro: 7.1 g/dL / ALK PHOS: 81 U/L / ALT: 44 U/L / AST: 25 U/L / GGT: x                                   Daily Height in cm: 160.02 (18 Dec 2020 11:04)    Daily     I&O's Summary      Vital Signs Last 24 Hrs  T(C): 36.7 (19 Dec 2020 05:43), Max: 36.8 (18 Dec 2020 10:45)  T(F): 98.1 (19 Dec 2020 05:43), Max: 98.3 (18 Dec 2020 10:45)  HR: 100 (19 Dec 2020 05:43) (83 - 100)  BP: 171/89 (19 Dec 2020 05:43) (136/71 - 171/89)  BP(mean): --  RR: 16 (19 Dec 2020 05:43) (16 - 17)  SpO2: 94% (19 Dec 2020 05:43) (94% - 100%)    PHYSICAL EXAM:   · Constitutional	Well-developed, well nourished  · Eyes	EOMI; PERRL; no drainage or redness  · ENMT	No oral lesions; no gross abnormalities  · Neck	No bruits; no thyromegaly or nodules  · Respiratory	Normal breath sounds b/l, No RRW  · Cardiovascular	Regular rate & rhythm, normal S1, S2; no murmurs, gallops or rubs; no S3, S4  · Gastrointestinal	Soft, non-tender, no hepatosplenomegaly, normal bowel sounds  · Extremities	No cyanosis, clubbing or edema  · Vascular	Equal and normal pulses (carotid, femoral, dorsalis pedis)  · Neurological	Alert & oriented; no sensory, motor or coordination deficits, normal reflexes       (0) independent

## 2020-12-19 NOTE — CONSULT NOTE ADULT - ASSESSMENT
61 y/o female PMH CAD s/p 3v CABG ([place], 2004), HTN, HLD, overweight, current smoker, with c/o SCHAFFER after walking about 0.25 miles. She also c/o mild chest pressure when carrying load and walking. Symptoms improve with slowing down or stopping. Seen and evaluated by cardiologist, Dr. Selina Gonzalez, and noted to have more TWI anteriorly on EKG. Referred for C.  She is now s/p PCI to Cx.  Overall doing well    c/w ASA and plavix  c/w crestor  BP elevated - increase losartan to 100mg qd  c/w ranexa  toprol intolerance noted on chart  Pt to f/u with Dr Gonzalez upon discharge  smoking cessation    Thank you for this consult  Will continue to follow while admitted

## 2020-12-19 NOTE — DISCHARGE NOTE NURSING/CASE MANAGEMENT/SOCIAL WORK - PATIENT PORTAL LINK FT
You can access the FollowMyHealth Patient Portal offered by [facility] by registering at the following website: [email or web address]. By joining [facility]’s FollowMyHealth portal, you will also be able to view your health information using other applications (apps) compatible with our system.

## 2020-12-30 ENCOUNTER — APPOINTMENT (OUTPATIENT)
Dept: CARDIOLOGY | Facility: CLINIC | Age: 60
End: 2020-12-30
Payer: COMMERCIAL

## 2020-12-30 ENCOUNTER — MED ADMIN CHARGE (OUTPATIENT)
Age: 60
End: 2020-12-30

## 2020-12-30 PROCEDURE — 93306 TTE W/DOPPLER COMPLETE: CPT

## 2020-12-30 PROCEDURE — 99072 ADDL SUPL MATRL&STAF TM PHE: CPT

## 2020-12-30 RX ORDER — PERFLUTREN 6.52 MG/ML
6.52 INJECTION, SUSPENSION INTRAVENOUS
Qty: 1 | Refills: 0 | Status: COMPLETED | OUTPATIENT
Start: 2020-12-30

## 2020-12-30 RX ADMIN — PERFLUTREN MG/ML: 6.52 INJECTION, SUSPENSION INTRAVENOUS at 00:00

## 2021-01-14 ENCOUNTER — APPOINTMENT (OUTPATIENT)
Dept: CARDIOLOGY | Facility: CLINIC | Age: 61
End: 2021-01-14

## 2021-01-27 ENCOUNTER — NON-APPOINTMENT (OUTPATIENT)
Age: 61
End: 2021-01-27

## 2021-01-27 ENCOUNTER — APPOINTMENT (OUTPATIENT)
Dept: CARDIOLOGY | Facility: CLINIC | Age: 61
End: 2021-01-27
Payer: COMMERCIAL

## 2021-01-27 VITALS
BODY MASS INDEX: 42.17 KG/M2 | OXYGEN SATURATION: 94 % | WEIGHT: 238 LBS | HEIGHT: 63 IN | SYSTOLIC BLOOD PRESSURE: 132 MMHG | HEART RATE: 87 BPM | DIASTOLIC BLOOD PRESSURE: 83 MMHG

## 2021-01-27 PROCEDURE — 93000 ELECTROCARDIOGRAM COMPLETE: CPT

## 2021-01-27 PROCEDURE — 99214 OFFICE O/P EST MOD 30 MIN: CPT

## 2021-01-27 PROCEDURE — 99072 ADDL SUPL MATRL&STAF TM PHE: CPT

## 2021-01-27 NOTE — DISCUSSION/SUMMARY
[FreeTextEntry1] : 60-year-old woman with a history as listed above who presents today for a followup evaluation.\par Ani has improved post her PCI. Though she is still left with significant CAD especially small vessel disease. We spoke about medical management for this. She will continue with DAPT for at least 6 months if not indefinitely given her CAD burden. She had an echo in 12/2020 that showed normal systolic LV function without any significant other findings, including no significant valvular disease. \par Her blood pressure is controlled today on exam. She will continue Losartan 50mg Qday.  She will try the Toprol 25mg Qday as she had not started it the last visit.  She will continue  Ranexa 500mg Q12. \par She will increase her with Crestor 20mg HS. Recheck lipids in 3 months. \par At your convenience, please fax me her latest lab results including lipid profile. \par Exercise and diet counseling was performed in order to reduce her future cardiovascular risk.\par She will followup with me in 2 months or sooner if necessary. She will follow up with you for all of her other medical needs.

## 2021-01-27 NOTE — REVIEW OF SYSTEMS
[see HPI] : see HPI [Chest  Pressure] : chest pressure [Dyspnea on exertion] : dyspnea during exertion [Negative] : Heme/Lymph [Shortness Of Breath] : no shortness of breath [Chest Pain] : no chest pain [Lower Ext Edema] : no extremity edema [Leg Claudication] : no intermittent leg claudication [Palpitations] : no palpitations

## 2021-01-27 NOTE — PHYSICAL EXAM
[Well Groomed] : well groomed [General Appearance - In No Acute Distress] : no acute distress [Normal Conjunctiva] : the conjunctiva exhibited no abnormalities [Eyelids - No Xanthelasma] : the eyelids demonstrated no xanthelasmas [Normal Oral Mucosa] : normal oral mucosa [No Oral Pallor] : no oral pallor [No Oral Cyanosis] : no oral cyanosis [Normal Jugular Venous A Waves Present] : normal jugular venous A waves present [Normal Jugular Venous V Waves Present] : normal jugular venous V waves present [No Jugular Venous Diana A Waves] : no jugular venous diana A waves [Respiration, Rhythm And Depth] : normal respiratory rhythm and effort [Exaggerated Use Of Accessory Muscles For Inspiration] : no accessory muscle use [Auscultation Breath Sounds / Voice Sounds] : lungs were clear to auscultation bilaterally [Abdomen Soft] : soft [Abdomen Tenderness] : non-tender [Abdomen Mass (___ Cm)] : no abdominal mass palpated [Abnormal Walk] : normal gait [Gait - Sufficient For Exercise Testing] : the gait was sufficient for exercise testing [Nail Clubbing] : no clubbing of the fingernails [Cyanosis, Localized] : no localized cyanosis [Petechial Hemorrhages (___cm)] : no petechial hemorrhages [Skin Color & Pigmentation] : normal skin color and pigmentation [] : no rash [No Venous Stasis] : no venous stasis [Skin Lesions] : no skin lesions [No Skin Ulcers] : no skin ulcer [No Xanthoma] : no  xanthoma was observed [Oriented To Time, Place, And Person] : oriented to person, place, and time [Affect] : the affect was normal [Mood] : the mood was normal [No Anxiety] : not feeling anxious [Normal Rate] : normal [Rhythm Regular] : regular [Normal S1] : normal S1 [Normal S2] : normal S2 [No Gallop] : no gallop heard [I] : a grade 1 [2+] : left 2+ [1+] : left 1+ [No Abnormalities] : the abdominal aorta was not enlarged and no bruit was heard [___+] : [unfilled]U+ pretibial pitting edema on the right [Right Carotid Bruit] : no bruit heard over the right carotid [Left Carotid Bruit] : no bruit heard over the left carotid [Bruit] : no bruit heard [Rt] : no varicose veins of the right leg [Lt] : no varicose veins of the left leg

## 2021-03-02 ENCOUNTER — NON-APPOINTMENT (OUTPATIENT)
Age: 61
End: 2021-03-02

## 2021-03-02 ENCOUNTER — APPOINTMENT (OUTPATIENT)
Dept: CARDIOLOGY | Facility: CLINIC | Age: 61
End: 2021-03-02
Payer: COMMERCIAL

## 2021-03-02 VITALS
BODY MASS INDEX: 43.59 KG/M2 | OXYGEN SATURATION: 95 % | SYSTOLIC BLOOD PRESSURE: 131 MMHG | HEART RATE: 91 BPM | HEIGHT: 63 IN | WEIGHT: 246 LBS | DIASTOLIC BLOOD PRESSURE: 88 MMHG

## 2021-03-02 VITALS — DIASTOLIC BLOOD PRESSURE: 80 MMHG | SYSTOLIC BLOOD PRESSURE: 124 MMHG

## 2021-03-02 PROCEDURE — 99214 OFFICE O/P EST MOD 30 MIN: CPT

## 2021-03-02 PROCEDURE — 93000 ELECTROCARDIOGRAM COMPLETE: CPT

## 2021-03-02 PROCEDURE — 99072 ADDL SUPL MATRL&STAF TM PHE: CPT

## 2021-03-02 NOTE — REVIEW OF SYSTEMS
[see HPI] : see HPI [Shortness Of Breath] : no shortness of breath [Dyspnea on exertion] : dyspnea during exertion [Chest  Pressure] : chest pressure [Chest Pain] : no chest pain [Lower Ext Edema] : no extremity edema [Leg Claudication] : no intermittent leg claudication [Palpitations] : no palpitations [Negative] : Heme/Lymph

## 2021-03-02 NOTE — HISTORY OF PRESENT ILLNESS
[FreeTextEntry1] : 60 year old with HLD, HTN, CAD s/p CABG, overweight, chronic tobacco use. she had a coronary angiography 12/2020. She was noted to have a closed SVG to OM and closed SVG to RCA. She has a 80% pLcx leasion that collateralized the RCA. Now s/p PCI of p/m LCx. \par \par She presents for an followup visit.\par \par Since her last visit she has cut down the tobacco to 1 pack per week.  She has gained weight.   Currently, she denies any chest pain,  PND, orthopnea, palpitations, near syncope, syncope.  She is compliant with her medications.   \par  \par

## 2021-03-02 NOTE — DISCUSSION/SUMMARY
[FreeTextEntry1] : 60-year-old woman with a history as listed above who presents today for a followup evaluation.\par Ani has improved post her PCI. Though she is still left with significant CAD especially small vessel disease. We spoke about medical management for this. She will continue with DAPT for at least 6 months if not indefinitely given her CAD burden. She had an echo in 12/2020 that showed normal systolic LV function without any significant other findings, including no significant valvular disease. \par Her blood pressure is controlled today on exam. She will continue Losartan 50mg Qday.  She will to Toprol 50mg Qday   She will continue  Ranexa 500mg Q12. \par She will continue Crestor 20mg HS. Recheck lipids prior to her next visit. \par Exercise and diet counseling was performed in order to reduce her future cardiovascular risk.\par She will followup with me in 3 months or sooner if necessary. She will follow up with you for all of her other medical needs.

## 2021-05-24 ENCOUNTER — APPOINTMENT (OUTPATIENT)
Dept: CARDIOLOGY | Facility: CLINIC | Age: 61
End: 2021-05-24
Payer: COMMERCIAL

## 2021-05-24 ENCOUNTER — NON-APPOINTMENT (OUTPATIENT)
Age: 61
End: 2021-05-24

## 2021-05-24 VITALS
WEIGHT: 241 LBS | HEIGHT: 63 IN | BODY MASS INDEX: 42.7 KG/M2 | SYSTOLIC BLOOD PRESSURE: 143 MMHG | OXYGEN SATURATION: 87 % | HEART RATE: 95 BPM | DIASTOLIC BLOOD PRESSURE: 85 MMHG

## 2021-05-24 PROCEDURE — 99406 BEHAV CHNG SMOKING 3-10 MIN: CPT

## 2021-05-24 PROCEDURE — 93000 ELECTROCARDIOGRAM COMPLETE: CPT

## 2021-05-24 PROCEDURE — 99214 OFFICE O/P EST MOD 30 MIN: CPT

## 2021-05-24 PROCEDURE — 99072 ADDL SUPL MATRL&STAF TM PHE: CPT

## 2021-05-24 NOTE — HISTORY OF PRESENT ILLNESS
[FreeTextEntry1] : 60 year old with HLD, HTN, CAD s/p CABG, overweight, chronic tobacco use. she had a coronary angiography 12/2020. She was noted to have a closed SVG to OM and closed SVG to RCA. She has a 80% pLcx leasion that collateralized the RCA. Now s/p PCI of p/m LCx. \par \par She presents for an followup visit.\par Since her last visit  she has been complaining of SCHAFFER and chest pressure that happens with walking 5 minutes.    Currently, she denies any  PND, orthopnea, palpitations, near syncope, syncope. \par She lost about 7 pounds with [employer]. She is smoking about 1-2 pack a week. \par  She is compliant with her medications.   \par  \par

## 2021-05-24 NOTE — PHYSICAL EXAM
[Well Groomed] : well groomed [General Appearance - In No Acute Distress] : no acute distress [Normal Conjunctiva] : the conjunctiva exhibited no abnormalities [Eyelids - No Xanthelasma] : the eyelids demonstrated no xanthelasmas [Normal Oral Mucosa] : normal oral mucosa [No Oral Pallor] : no oral pallor [No Oral Cyanosis] : no oral cyanosis [Normal Jugular Venous A Waves Present] : normal jugular venous A waves present [Normal Jugular Venous V Waves Present] : normal jugular venous V waves present [No Jugular Venous Diana A Waves] : no jugular venous diana A waves [Exaggerated Use Of Accessory Muscles For Inspiration] : no accessory muscle use [Respiration, Rhythm And Depth] : normal respiratory rhythm and effort [Auscultation Breath Sounds / Voice Sounds] : lungs were clear to auscultation bilaterally [Abdomen Soft] : soft [Abdomen Tenderness] : non-tender [Abdomen Mass (___ Cm)] : no abdominal mass palpated [Abnormal Walk] : normal gait [Gait - Sufficient For Exercise Testing] : the gait was sufficient for exercise testing [Nail Clubbing] : no clubbing of the fingernails [Cyanosis, Localized] : no localized cyanosis [Petechial Hemorrhages (___cm)] : no petechial hemorrhages [Skin Color & Pigmentation] : normal skin color and pigmentation [] : no rash [No Venous Stasis] : no venous stasis [Skin Lesions] : no skin lesions [No Skin Ulcers] : no skin ulcer [No Xanthoma] : no  xanthoma was observed [Oriented To Time, Place, And Person] : oriented to person, place, and time [Affect] : the affect was normal [Mood] : the mood was normal [No Anxiety] : not feeling anxious [Normal Rate] : normal [Rhythm Regular] : regular [Normal S1] : normal S1 [Normal S2] : normal S2 [No Gallop] : no gallop heard [I] : a grade 1 [2+] : left 2+ [1+] : left 1+ [No Abnormalities] : the abdominal aorta was not enlarged and no bruit was heard [___+] : [unfilled]U+ pretibial pitting edema on the right [Right Carotid Bruit] : no bruit heard over the right carotid [Left Carotid Bruit] : no bruit heard over the left carotid [Bruit] : no bruit heard [Rt] : no varicose veins of the right leg [Lt] : no varicose veins of the left leg

## 2021-05-24 NOTE — DISCUSSION/SUMMARY
[FreeTextEntry1] : 60-year-old woman with a history as listed above who presents today for a followup evaluation.\par Silvia is still complaining of SCHAFFER and chest pain. This could related to her residual significant CAD especially small vessel disease. We spoke about medical management for this. She will continue with DAPT   indefinitely given her CAD burden. She had an echo in 12/2020 that showed normal systolic LV function without any significant other findings, including no significant valvular disease. \par Her blood pressure is controlled today on exam. She will continue Losartan 50mg Qday.  She increase to Toprol 1000mg Qday   She will increase the Ranexa 1000mg Q12. \par Some of her symptoms maybe pulmonary. She needs to see pulmonary to assess her PFTs given her smoking history. Her O2 sat today was not accurate 2/2 her opaque nail polish. \par She will continue Crestor 20mg HS. Recheck lipids prior to her next visit. \par Exercise and diet counseling was performed in order to reduce her future cardiovascular risk.\par She will followup with me in 3 months or sooner if necessary. She will follow up with you for all of her other medical needs.

## 2021-06-10 ENCOUNTER — APPOINTMENT (OUTPATIENT)
Dept: CARDIOLOGY | Facility: CLINIC | Age: 61
End: 2021-06-10
Payer: COMMERCIAL

## 2021-06-10 ENCOUNTER — NON-APPOINTMENT (OUTPATIENT)
Age: 61
End: 2021-06-10

## 2021-06-10 VITALS
OXYGEN SATURATION: 94 % | HEART RATE: 89 BPM | WEIGHT: 238 LBS | SYSTOLIC BLOOD PRESSURE: 115 MMHG | BODY MASS INDEX: 42.17 KG/M2 | DIASTOLIC BLOOD PRESSURE: 75 MMHG | HEIGHT: 63 IN

## 2021-06-10 DIAGNOSIS — I20.9 ANGINA PECTORIS, UNSPECIFIED: ICD-10-CM

## 2021-06-10 PROCEDURE — 93000 ELECTROCARDIOGRAM COMPLETE: CPT

## 2021-06-10 PROCEDURE — 99072 ADDL SUPL MATRL&STAF TM PHE: CPT

## 2021-06-10 PROCEDURE — 99214 OFFICE O/P EST MOD 30 MIN: CPT

## 2021-06-13 PROBLEM — I20.9 ANGINAL PAIN: Status: ACTIVE | Noted: 2021-06-13

## 2021-06-13 NOTE — COUNSELING
[Risk of tobacco use and health benefits of smoking cessation discussed] : Risk of tobacco use and health benefits of smoking cessation discussed [Willing to Quit Smoking] : Willing to quit smoking [FreeTextEntry1] : 3

## 2021-06-13 NOTE — CARDIOLOGY SUMMARY
[___] : [unfilled] [No Ischemia] : no Ischemia [None] : normal LV function [de-identified] : SR old ASWMI nonspecific T wave changes.  [de-identified] : 12/2020 Normal global function.  [de-identified] : 12/2020.  noted to have a closed SVG to OM and closed SVG to RCA. She has a 80% pLcx leasion that collateralized the RCA. Now s/p PCI of p/m LCx.  [de-identified] : remote CABG

## 2021-06-13 NOTE — HISTORY OF PRESENT ILLNESS
[FreeTextEntry1] : 60 year old with HLD, HTN, CAD s/p CABG, overweight, chronic tobacco use. she had a coronary angiography 12/2020. She was noted to have a closed SVG to OM and closed SVG to RCA. She has a 80% pLcx leasion that collateralized the RCA. Now s/p PCI of p/m LCx. \par \par She presents for an followup visit.\par Since her last visit her dyspnea on exertion has greatly improved.  She is no longer having significant chest pressure.  She states that she is walking about 8 to 10 minutes without any symptoms.  Though she is too scared to continue walking for fear of recurrent symptoms.   Currently, she denies any  PND, orthopnea, palpitations, near syncope, syncope. \par She is still smoking but actively trying to reduce the amount of cigarettes that she smokes.\par  She is compliant with her medications.   \par  \par

## 2021-06-13 NOTE — DISCUSSION/SUMMARY
[FreeTextEntry1] : 60-year-old woman with a history as listed above who presents today for a followup evaluation.\par Silvia has stable angina which is likely related to her residual significant CAD especially small vessel disease. We spoke about medical management for this. She will continue with DAPT   indefinitely given her CAD burden. She had an echo in 12/2020 that showed normal systolic LV function without any significant other findings, including no significant valvular disease. \par Her blood pressure is controlled today on exam. She will continue Losartan 50mg Qday.  She increase to Toprol 100mg Q12  and continue on  Ranexa 1000mg Q12 which has helped her. She did not tolerate nitrates in the past. Will consider Amlodipine in the future. \par  She still needs to see pulmonary to assess her PFTs given her smoking history. \par She will continue Crestor 20mg HS. Recheck lipids prior to her next visit. \par Exercise and diet counseling was performed in order to reduce her future cardiovascular risk.\par She will followup with me in 3 months or sooner if necessary. She will follow up with you for all of her other medical needs.

## 2021-09-10 ENCOUNTER — NON-APPOINTMENT (OUTPATIENT)
Age: 61
End: 2021-09-10

## 2021-09-10 ENCOUNTER — APPOINTMENT (OUTPATIENT)
Dept: CARDIOLOGY | Facility: CLINIC | Age: 61
End: 2021-09-10
Payer: COMMERCIAL

## 2021-09-10 VITALS
HEART RATE: 77 BPM | OXYGEN SATURATION: 95 % | SYSTOLIC BLOOD PRESSURE: 117 MMHG | DIASTOLIC BLOOD PRESSURE: 74 MMHG | BODY MASS INDEX: 40.93 KG/M2 | HEIGHT: 63 IN | WEIGHT: 231 LBS

## 2021-09-10 PROCEDURE — 93000 ELECTROCARDIOGRAM COMPLETE: CPT

## 2021-09-10 PROCEDURE — 99214 OFFICE O/P EST MOD 30 MIN: CPT

## 2021-09-10 NOTE — PHYSICAL EXAM
[Well Groomed] : well groomed [General Appearance - In No Acute Distress] : no acute distress [Normal Conjunctiva] : the conjunctiva exhibited no abnormalities [Eyelids - No Xanthelasma] : the eyelids demonstrated no xanthelasmas [Normal Oral Mucosa] : normal oral mucosa [No Oral Pallor] : no oral pallor [No Oral Cyanosis] : no oral cyanosis [Normal Jugular Venous A Waves Present] : normal jugular venous A waves present [Normal Jugular Venous V Waves Present] : normal jugular venous V waves present [No Jugular Venous Diana A Waves] : no jugular venous diana A waves [Respiration, Rhythm And Depth] : normal respiratory rhythm and effort [Exaggerated Use Of Accessory Muscles For Inspiration] : no accessory muscle use [Auscultation Breath Sounds / Voice Sounds] : lungs were clear to auscultation bilaterally [Abdomen Soft] : soft [Abdomen Tenderness] : non-tender [Abdomen Mass (___ Cm)] : no abdominal mass palpated [Abnormal Walk] : normal gait [Gait - Sufficient For Exercise Testing] : the gait was sufficient for exercise testing [Nail Clubbing] : no clubbing of the fingernails [Cyanosis, Localized] : no localized cyanosis [Petechial Hemorrhages (___cm)] : no petechial hemorrhages [Skin Color & Pigmentation] : normal skin color and pigmentation [] : no rash [No Venous Stasis] : no venous stasis [Skin Lesions] : no skin lesions [No Skin Ulcers] : no skin ulcer [No Xanthoma] : no  xanthoma was observed [Oriented To Time, Place, And Person] : oriented to person, place, and time [Affect] : the affect was normal [Mood] : the mood was normal [No Anxiety] : not feeling anxious [Normal Rate] : normal [Rhythm Regular] : regular [Normal S1] : normal S1 [Normal S2] : normal S2 [No Gallop] : no gallop heard [I] : a grade 1 [2+] : left 2+ [Right Carotid Bruit] : no bruit heard over the right carotid [Left Carotid Bruit] : no bruit heard over the left carotid [1+] : left 1+ [No Abnormalities] : the abdominal aorta was not enlarged and no bruit was heard [Bruit] : no bruit heard [___+] : [unfilled]U+ pretibial pitting edema on the right [Lt] : no varicose veins of the left leg [Rt] : no varicose veins of the right leg

## 2021-09-10 NOTE — DISCUSSION/SUMMARY
[FreeTextEntry1] : 61-year-old woman with a history as listed above who presents today for a followup evaluation.\par Silvia has stable angina which is likely related to her residual significant CAD especially small vessel disease. We spoke about medical management for this. She will continue with DAPT   indefinitely given her CAD burden. She had an echo in 12/2020 that showed normal systolic LV function without any significant other findings, including no significant valvular disease. \par Her blood pressure is controlled today on exam. She will continue Losartan 50mg Qday.  She will continue  Toprol 150mg Qday  and continue on  Ranexa 1000mg Q12 which has helped her. She did not tolerate nitrates in the past. Will consider Amlodipine in the future. \par  She still needs to see pulmonary to assess her PFTs given her smoking history. \par She will continue Crestor 20mg HS. Recheck lipids prior to her next visit. \par Exercise and diet counseling was performed in order to reduce her future cardiovascular risk.\par She will followup with me in 3 months or sooner if necessary. She will follow up with you for all of her other medical needs.

## 2021-09-10 NOTE — COUNSELING
[Risk of tobacco use and health benefits of smoking cessation discussed] : Risk of tobacco use and health benefits of smoking cessation discussed [Willing to Quit Smoking] : Willing to quit smoking no diplopia/no blurred vision L [FreeTextEntry1] : 3

## 2021-09-10 NOTE — CARDIOLOGY SUMMARY
[de-identified] : 12/2020 Normal global function.  [de-identified] : Sinus  Rhythm \par -Left atrial enlargement. \par  -  Negative precordial T-waves  [de-identified] : 12/2020.  noted to have a closed SVG to OM and closed SVG to RCA. She has a 80% pLcx leasion that collateralized the RCA. Now s/p PCI of p/m LCx.  [de-identified] : remote CABG

## 2021-09-10 NOTE — HISTORY OF PRESENT ILLNESS
[FreeTextEntry1] : 60 year old with HLD, HTN, CAD s/p CABG, overweight, chronic tobacco use. she had a coronary angiography 12/2020. She was noted to have a closed SVG to OM and closed SVG to RCA. She has a 80% pLcx leasion that collateralized the RCA. Now s/p PCI of p/m LCx. \par \par She presents for an followup visit.\par Since her last visit her dyspnea on exertion has greatly improved.  She is no longer having significant chest pressure.  She is still very sedentary from her back pain. \par    Currently, she denies any  PND, orthopnea, palpitations, near syncope, syncope. \par She is still smoking but actively trying to reduce the amount of cigarettes that she smokes.\par She is on Hillary [place] and she has lsot more weight. \par  She is compliant with her medications.   \par  \par

## 2021-10-04 ENCOUNTER — RX RENEWAL (OUTPATIENT)
Age: 61
End: 2021-10-04

## 2022-01-04 ENCOUNTER — APPOINTMENT (OUTPATIENT)
Dept: CARDIOLOGY | Facility: CLINIC | Age: 62
End: 2022-01-04
Payer: COMMERCIAL

## 2022-01-04 ENCOUNTER — NON-APPOINTMENT (OUTPATIENT)
Age: 62
End: 2022-01-04

## 2022-01-04 VITALS
BODY MASS INDEX: 39.87 KG/M2 | DIASTOLIC BLOOD PRESSURE: 85 MMHG | WEIGHT: 225 LBS | OXYGEN SATURATION: 94 % | SYSTOLIC BLOOD PRESSURE: 133 MMHG | HEIGHT: 63 IN | HEART RATE: 97 BPM

## 2022-01-04 VITALS — SYSTOLIC BLOOD PRESSURE: 110 MMHG | DIASTOLIC BLOOD PRESSURE: 70 MMHG

## 2022-01-04 PROCEDURE — 99214 OFFICE O/P EST MOD 30 MIN: CPT

## 2022-01-04 PROCEDURE — 93000 ELECTROCARDIOGRAM COMPLETE: CPT

## 2022-01-04 NOTE — DISCUSSION/SUMMARY
[FreeTextEntry1] : 61-year-old woman with a history as listed above who presents today for a followup evaluation.\par Silvia has stable angina which is likely related to her residual significant CAD especially small vessel disease. We spoke about medical management for this. She will continue with DAPT   indefinitely given her CAD burden. She had an echo in 12/2020 that showed normal systolic LV function without any significant other findings, including no significant valvular disease. \par \par Her blood pressure is controlled today on exam. She will continue Losartan 50mg Qday.  She will continue  Toprol 150mg Qday  and continue on  Ranexa 1000mg Q12 which has helped her. She did not tolerate nitrates in the past. Will consider Amlodipine in the future. \par \par  She still needs to see pulmonary to assess her PFTs given her smoking history. \par She will continue Crestor 20mg HS. At your convenience, please fax me her latest lab results including lipid profile. \par \par Exercise and diet counseling was performed in order to reduce her future cardiovascular risk.\par \par She will followup with me in 3 months or sooner if necessary. She will follow up with you for all of her other medical needs.

## 2022-01-04 NOTE — HISTORY OF PRESENT ILLNESS
[FreeTextEntry1] : 61 year old with HLD, HTN, CAD s/p CABG, overweight, chronic tobacco use. she had a coronary angiography 12/2020. She was noted to have a closed SVG to OM and closed SVG to RCA. She has a 80% pLcx leasion that collateralized the RCA. Now s/p PCI of p/m LCx. \par \par She presents for an followup visit.\par Since her last visit, she has been complaining of significant back pain. Her SCHAFFER is unchanged but greatly improved from previous. She denies any chest pain.  Currently, she denies any  PND, orthopnea, palpitations, near syncope, syncope. \par She is still smoking but actively trying to reduce the amount of cigarettes that she smokes.\par She is on Optivia and she has lost more weight. \par  She is compliant with her medications.  Medication reconciliation performed. \par  \par

## 2022-01-04 NOTE — CARDIOLOGY SUMMARY
[de-identified] : Sinus  Rhythm \par Left atrial enlargement. \par ASWMI\par nonspecific T wave.  [de-identified] : 12/2020 Normal global function.  [de-identified] : 12/2020.  noted to have a closed SVG to OM and closed SVG to RCA. She has a 80% pLcx leasion that collateralized the RCA. Now s/p PCI of p/m LCx.  [de-identified] : remote CABG

## 2022-04-14 ENCOUNTER — RX RENEWAL (OUTPATIENT)
Age: 62
End: 2022-04-14

## 2022-06-17 ENCOUNTER — NON-APPOINTMENT (OUTPATIENT)
Age: 62
End: 2022-06-17

## 2022-06-17 ENCOUNTER — APPOINTMENT (OUTPATIENT)
Dept: CARDIOLOGY | Facility: CLINIC | Age: 62
End: 2022-06-17

## 2022-06-17 VITALS
HEIGHT: 63 IN | OXYGEN SATURATION: 96 % | HEART RATE: 78 BPM | BODY MASS INDEX: 42.17 KG/M2 | DIASTOLIC BLOOD PRESSURE: 101 MMHG | WEIGHT: 238 LBS | SYSTOLIC BLOOD PRESSURE: 141 MMHG

## 2022-06-17 DIAGNOSIS — R94.31 ABNORMAL ELECTROCARDIOGRAM [ECG] [EKG]: ICD-10-CM

## 2022-06-17 PROCEDURE — 99214 OFFICE O/P EST MOD 30 MIN: CPT

## 2022-06-17 PROCEDURE — 93000 ELECTROCARDIOGRAM COMPLETE: CPT

## 2022-06-30 ENCOUNTER — NON-APPOINTMENT (OUTPATIENT)
Age: 62
End: 2022-06-30

## 2022-07-01 NOTE — CARDIOLOGY SUMMARY
[de-identified] : Sinus  Rhythm \par Left atrial enlargement. \par ASWMI\par nonspecific T wave.  [de-identified] : 12/2020 Normal global function.  [de-identified] : 12/2020.  noted to have a closed SVG to OM and closed SVG to RCA. She has a 80% pLcx leasion that collateralized the RCA. Now s/p PCI of p/m LCx.  [de-identified] : remote CABG

## 2022-07-01 NOTE — DISCUSSION/SUMMARY
[FreeTextEntry1] : 61-year-old woman with a history as listed above who presents today for a followup evaluation.\par Silvia has stable angina which is likely related to her residual significant CAD especially small vessel disease. We spoke about medical management for this. She will continue with DAPT   indefinitely given her CAD burden. She had an echo in 12/2020 that showed normal systolic LV function without any significant other findings, including no significant valvular disease. \par \par Her blood pressure is uncontrolled today on exam. She will increase her Losartan to 100mg Qday.  She will continue  Toprol 150mg Qday  and continue on  Ranexa 1000mg Q12 which has helped her. She did not tolerate nitrates in the past. Will consider Amlodipine in the future. \par \par  She still needs to see pulmonary to assess her PFTs given her smoking history. \par She will continue Crestor 20mg HS. At your convenience, please fax me her latest lab results including lipid profile. \par \par Exercise and diet counseling was performed in order to reduce her future cardiovascular risk.\par \par She will followup with me in 3 months or sooner if necessary. She will follow up with you for all of her other medical needs.

## 2022-07-01 NOTE — PHYSICAL EXAM
[Right Carotid Bruit] : no bruit heard over the right carotid [Left Carotid Bruit] : no bruit heard over the left carotid [Bruit] : no bruit heard [Rt] : no varicose veins of the right leg [Lt] : no varicose veins of the left leg

## 2022-07-01 NOTE — HISTORY OF PRESENT ILLNESS
[FreeTextEntry1] : 62 year old with HLD, HTN, CAD s/p CABG, overweight, chronic tobacco use. she had a coronary angiography 12/2020. She was noted to have a closed SVG to OM and closed SVG to RCA. She has a 80% pLcx leasion that collateralized the RCA. Now s/p PCI of p/m LCx. \par \par She presents for an followup visit.\par Since her last visit, she has been complaining of significant back pain. Her SCHAFFER is unchanged but greatly improved from previous. She denies any chest pain.  Currently, she denies any  PND, orthopnea, palpitations, near syncope, syncope. She stopped Optava and gained weight. \par She is still smoking but actively trying to reduce the amount of cigarettes that she smokes.\par  She is compliant with her medications.  Medication reconciliation performed. \par  \par

## 2022-08-24 ENCOUNTER — NON-APPOINTMENT (OUTPATIENT)
Age: 62
End: 2022-08-24

## 2022-08-30 ENCOUNTER — APPOINTMENT (OUTPATIENT)
Dept: CARDIOLOGY | Facility: CLINIC | Age: 62
End: 2022-08-30

## 2022-09-26 ENCOUNTER — RX RENEWAL (OUTPATIENT)
Age: 62
End: 2022-09-26

## 2022-11-16 ENCOUNTER — NON-APPOINTMENT (OUTPATIENT)
Age: 62
End: 2022-11-16

## 2022-11-16 ENCOUNTER — APPOINTMENT (OUTPATIENT)
Dept: CARDIOLOGY | Facility: CLINIC | Age: 62
End: 2022-11-16

## 2022-11-16 VITALS
DIASTOLIC BLOOD PRESSURE: 81 MMHG | HEIGHT: 63 IN | WEIGHT: 237 LBS | HEART RATE: 73 BPM | SYSTOLIC BLOOD PRESSURE: 136 MMHG | BODY MASS INDEX: 41.99 KG/M2 | OXYGEN SATURATION: 95 %

## 2022-11-16 DIAGNOSIS — R00.0 TACHYCARDIA, UNSPECIFIED: ICD-10-CM

## 2022-11-16 PROCEDURE — 93000 ELECTROCARDIOGRAM COMPLETE: CPT

## 2022-11-16 PROCEDURE — 99214 OFFICE O/P EST MOD 30 MIN: CPT | Mod: 25

## 2022-11-16 NOTE — COUNSELING
[Yes] : Risk of tobacco use and health benefits of smoking cessation discussed: Yes [Cessation strategies including cessation program discussed] : Cessation strategies including cessation program discussed [Use of nicotine replacement therapies and other medications discussed] : Use of nicotine replacement therapies and other medications discussed [No] : Not willing to quit smoking [FreeTextEntry1] : 3

## 2022-11-16 NOTE — DISCUSSION/SUMMARY
[FreeTextEntry1] : 62-year-old woman with a history as listed above who presents today for a followup evaluation.\par \par Ani had an episode palpitations and dyspnea. Likely this was all related to overexertion.  Her EKG did not reveal any significant ischemic changes. She will get a Holter to rule out arrhythmias. She will get a 2d echo to assess for any  new structural heart disease, changes in valvular and ventricular function. \par \par stable angina which is likely related to her residual significant CAD especially small vessel disease. We spoke about medical management for this. She will continue with DAPT   indefinitely given her CAD burden. \par \par Her blood pressure is uncontrolled today on exam. She will increase her Losartan to 100mg Qday.  She will continue  Toprol 150mg Qday  and continue on  Ranexa 1000mg Q12 which has helped her. She did not tolerate nitrates in the past. Will consider Amlodipine in the future. \par \par  She still needs to see pulmonary to assess her PFTs given her smoking history. \par She will continue Crestor 20mg HS. At your convenience, please fax me her latest lab results including lipid profile. \par \par Exercise and diet counseling was performed in order to reduce her future cardiovascular risk.\par \par She will followup with me in 3 months or sooner if necessary. She will follow up with you for all of her other medical needs. [EKG obtained to assist in diagnosis and management of assessed problem(s)] : EKG obtained to assist in diagnosis and management of assessed problem(s)

## 2022-11-16 NOTE — CARDIOLOGY SUMMARY
[de-identified] : Sinus  Rhythm \par Left atrial enlargement. \par ASWMI\par nonspecific T wave.  [de-identified] : 12/2020 Normal global function.  [de-identified] : 12/2020.  noted to have a closed SVG to OM and closed SVG to RCA. She has a 80% pLcx leasion that collateralized the RCA. Now s/p PCI of p/m LCx.  [de-identified] : remote CABG

## 2022-11-16 NOTE — HISTORY OF PRESENT ILLNESS
[FreeTextEntry1] : 62 year old with HLD, HTN, CAD s/p CABG, overweight, chronic tobacco use. she had a coronary angiography 12/2020. She was noted to have a closed SVG to OM and closed SVG to RCA. She has a 80% pLcx leasion that collateralized the RCA. Now s/p PCI of p/m LCx. \par \par She presents for an followup visit.\par Since her last visit, she has been walking about a block a day.  Her SCHAFFER is unchanged but greatly improved from previous. She denies any chest pain.  \par Though she noted on Saturday she was at her gr[facility]ons [facility]. She noted that she became dypneic, diaphoretic after carrying objects inside and out of [employer]. She oted palpitations once she sat down for 15 minutes and then self limited. \par Currently, she denies any  PND, orthopnea, near syncope, syncope. \par She is still smoking but actively trying to reduce the amount of cigarettes that she smokes.\par  She is compliant with her medications.  Medication reconciliation performed. \par  \par

## 2022-12-14 ENCOUNTER — APPOINTMENT (OUTPATIENT)
Dept: CARDIOLOGY | Facility: CLINIC | Age: 62
End: 2022-12-14

## 2022-12-14 PROCEDURE — 93306 TTE W/DOPPLER COMPLETE: CPT

## 2022-12-19 ENCOUNTER — APPOINTMENT (OUTPATIENT)
Dept: CARDIOLOGY | Facility: CLINIC | Age: 62
End: 2022-12-19
Payer: COMMERCIAL

## 2022-12-19 PROCEDURE — 93224 XTRNL ECG REC UP TO 48 HRS: CPT

## 2023-03-04 ENCOUNTER — EMERGENCY (EMERGENCY)
Facility: HOSPITAL | Age: 63
LOS: 1 days | Discharge: HOME | End: 2023-03-04
Attending: EMERGENCY MEDICINE | Admitting: EMERGENCY MEDICINE
Payer: COMMERCIAL

## 2023-03-04 VITALS
HEART RATE: 96 BPM | TEMPERATURE: 97 F | SYSTOLIC BLOOD PRESSURE: 124 MMHG | RESPIRATION RATE: 18 BRPM | DIASTOLIC BLOOD PRESSURE: 86 MMHG | HEIGHT: 63 IN | WEIGHT: 240.08 LBS | OXYGEN SATURATION: 96 %

## 2023-03-04 DIAGNOSIS — E89.0 POSTPROCEDURAL HYPOTHYROIDISM: Chronic | ICD-10-CM

## 2023-03-04 DIAGNOSIS — Z95.1 PRESENCE OF AORTOCORONARY BYPASS GRAFT: Chronic | ICD-10-CM

## 2023-03-04 DIAGNOSIS — Z98.891 HISTORY OF UTERINE SCAR FROM PREVIOUS SURGERY: Chronic | ICD-10-CM

## 2023-03-04 PROCEDURE — 12002 RPR S/N/AX/GEN/TRNK2.6-7.5CM: CPT

## 2023-03-04 PROCEDURE — 70450 CT HEAD/BRAIN W/O DYE: CPT | Mod: MA

## 2023-03-04 PROCEDURE — 90471 IMMUNIZATION ADMIN: CPT

## 2023-03-04 PROCEDURE — 90715 TDAP VACCINE 7 YRS/> IM: CPT

## 2023-03-04 PROCEDURE — 99284 EMERGENCY DEPT VISIT MOD MDM: CPT | Mod: 25

## 2023-03-04 PROCEDURE — 70450 CT HEAD/BRAIN W/O DYE: CPT | Mod: 26,MA

## 2023-03-04 RX ORDER — TETANUS TOXOID, REDUCED DIPHTHERIA TOXOID AND ACELLULAR PERTUSSIS VACCINE, ADSORBED 5; 2.5; 8; 8; 2.5 [IU]/.5ML; [IU]/.5ML; UG/.5ML; UG/.5ML; UG/.5ML
0.5 SUSPENSION INTRAMUSCULAR ONCE
Refills: 0 | Status: COMPLETED | OUTPATIENT
Start: 2023-03-04 | End: 2023-03-04

## 2023-03-04 RX ORDER — ACETAMINOPHEN 500 MG
650 TABLET ORAL ONCE
Refills: 0 | Status: COMPLETED | OUTPATIENT
Start: 2023-03-04 | End: 2023-03-04

## 2023-03-04 RX ADMIN — Medication 650 MILLIGRAM(S): at 16:08

## 2023-03-04 RX ADMIN — TETANUS TOXOID, REDUCED DIPHTHERIA TOXOID AND ACELLULAR PERTUSSIS VACCINE, ADSORBED 0.5 MILLILITER(S): 5; 2.5; 8; 8; 2.5 SUSPENSION INTRAMUSCULAR at 16:08

## 2023-03-04 NOTE — ED ADULT TRIAGE NOTE - GLASGOW COMA SCALE: EYE OPENING, MLM
(E4) spontaneous Consent (Lip)/Introductory Paragraph: The rationale for Mohs was explained to the patient and consent was obtained. The risks, benefits and alternatives to therapy were discussed in detail. Specifically, the risks of lip deformity, changes in the oral aperture, infection, scarring, bleeding, prolonged wound healing, incomplete removal, allergy to anesthesia, nerve injury and recurrence were addressed. Prior to the procedure, the treatment site was clearly identified and confirmed by the patient. All components of Universal Protocol/PAUSE Rule completed.

## 2023-03-04 NOTE — ED PROVIDER NOTE - OBJECTIVE STATEMENT
62-year-old female with history of CAD on Plavix and aspirin, HTN, HLD, hypothyroidism c/o scalp laceration status post fall today.  Patient states that she tripped on her shoes and a baby walker at home and hit her right frontal scalp on the corner of a piano.  Unsure of last tetanus.  Denies LOC, neck/back/hip pain, vision changes, headache, dizziness, nausea/vomiting, chest pain, shortness of breath, other injuries/symptoms.

## 2023-03-04 NOTE — ED PROVIDER NOTE - MUSCULOSKELETAL, MLM
Spine appears normal, range of motion is not limited, no muscle or joint tenderness, no C/T/L spine tenderness, BUE and BLE NT with FROM

## 2023-03-04 NOTE — ED PROVIDER NOTE - NSICDXPASTSURGICALHX_GEN_ALL_CORE_FT
PAST SURGICAL HISTORY:  H/O thyroidectomy     H/O:      Status post three vessel coronary artery bypass

## 2023-03-04 NOTE — ED PROVIDER NOTE - PROGRESS NOTE DETAILS
Reevaluated patient at bedside.  Patient feeling much improved.  Discussed the results of all diagnostic testing in ED and copies of all reports given.   An opportunity to ask questions was given.  Discussed the importance of prompt, close medical follow-up.  Patient will return with any changes, concerns or persistent / worsening symptoms.  Understanding of all instructions verbalized. Laceration repaired, pt tolerated well, NAD, tdap updated. Wound care instructions given. strict return precautions given. Pt aware of meningioma in brain from prior ct. Reevaluated patient at bedside.  Patient feeling much improved.  Discussed the results of all diagnostic testing in ED and copies of all reports given.   An opportunity to ask questions was given.  Discussed the importance of prompt, close medical follow-up.  Patient will return with any changes, concerns or persistent / worsening symptoms.  Understanding of all instructions verbalized.

## 2023-03-04 NOTE — ED PROVIDER NOTE - CLINICAL SUMMARY MEDICAL DECISION MAKING FREE TEXT BOX
62-year-old female with history of CAD stents CABG on aspirin and Plavix fell at home today.  Complaining of laceration to right scalp.  Denies headache LOC nausea vomiting visual disturbance neck or back pain or other injury or symptom.  Physical exam vital signs stable afebrile no distress.  Scalp with 3 cm laceration on right parietal region.  No bony deformity.  No active bleeding.  Pupils equal round reactive to light extraocular muscles intact.  Neck is supple nontender.  Extremities full range of motion intact atraumatic.  Neuro awake and alert no deficits gait normal DTRs +2.  Impression is status post fall scalp laceration rule out intracranial hemorrhage.  Rule out skull fracture.  Plan is CT brain, staples to scalp wound.  If CT negative discharged with wound care instructions and head injury instructions.

## 2023-03-04 NOTE — ED PROVIDER NOTE - PATIENT PORTAL LINK FT
You can access the FollowMyHealth Patient Portal offered by [facility] by registering at the following website: [email or web address]. By joining [employer]’s FollowMyHealth portal, you will also be able to view your health information using other applications (apps) compatible with our system.

## 2023-03-04 NOTE — ED PROVIDER NOTE - NSICDXPASTMEDICALHX_GEN_ALL_CORE_FT
PAST MEDICAL HISTORY:  Coronary artery disease     History of hyperlipidemia     History of hypertension     History of hypothyroidism

## 2023-03-04 NOTE — ED ADULT NURSE NOTE - OBJECTIVE STATEMENT
63 y/o female received aox4 ambulatory c/o head laceration after she tripped and fell today on her grandson's baby walker. 1 inch laceration noted to right side of head, stapled by EDPA.

## 2023-03-04 NOTE — ED PROVIDER NOTE - NSFOLLOWUPINSTRUCTIONS_ED_ALL_ED_FT
Staple removal in 7 days.  Keep wound clean and dry.  Follow-up with PCP.  Apply bacitracin to wound twice daily.  Take Tylenol as directed for pain.  Ice compresses.  If having worsening of symptoms, vomiting or other related symptoms, return to the ER immediately.    Head Injury, Adult       There are many types of head injuries. They can be as minor as a small bump. Some head injuries can be worse. Worse injuries include:  •A strong hit to the head that shakes the brain back and forth, causing damage (concussion).      •A bruise (contusion) of the brain. This means there is bleeding in the brain that can cause swelling.      •A cracked skull (skull fracture).      •Bleeding in the brain that gathers, gets thick (makes a clot), and forms a bump (hematoma).      Most problems from a head injury come in the first 24 hours. However, you may still have side effects up to 7–10 days after your injury. It is important to watch your condition for any changes. You may need to be watched in the emergency department or urgent care, or you may need to stay in the hospital.      What are the causes?    There are many possible causes of a head injury. A serious head injury may be caused by:  •A car accident.      •Bicycle or motorcycle accidents.      •Sports injuries.      •Falls.      •Being hit by an object.        What are the signs or symptoms?    Symptoms of a head injury include a bruise, bump, or bleeding where the injury happened. Other physical symptoms may include:  •Headache.      •Feeling like you may vomit (nauseous) or vomiting.      •Dizziness.      •Blurred or double vision.      •Being uncomfortable around bright lights or loud noises.      •Shaking movements that you cannot control (seizures).      •Feeling tired.      •Trouble being woken up.      •Fainting or loss of consciousness.      Mental or emotional symptoms may include:  •Feeling grumpy or cranky.      •Confusion and memory problems.      •Having trouble paying attention or concentrating.      •Changes in eating or sleeping habits.      •Feeling worried or nervous (anxious).      •Feeling sad (depressed).        How is this treated?    Treatment for this condition depends on how severe the injury is and the type of injury you have. The main goal is to prevent problems and to allow the brain time to heal.    Mild head injury     If you have a mild head injury, you may be sent home, and treatment may include:  •Being watched. A responsible adult should stay with you for 24 hours after your injury and check on you often.      •Physical rest.      •Brain rest.      •Pain medicines.      Severe head injury    If you have a severe head injury, treatment may include:  •Being watched closely. This includes staying in the hospital.    •Medicines to:  •Help with pain.      •Prevent seizures.      •Help with brain swelling.        •Protecting your airway and using a machine that helps you breathe (ventilator).      •Treatments to watch for and manage swelling inside the brain.    •Brain surgery. This may be needed to:  •Remove a collection of blood or blood clots.      •Stop the bleeding.      •Remove a part of the skull. This allows room for the brain to swell.          Follow these instructions at home:    Activity     •Rest.      •Avoid activities that are hard or tiring.      •Make sure you get enough sleep.    •Let your brain rest. Do this by limiting activities that need a lot of thought or attention, such as:  •Watching TV.      •Playing memory games and puzzles.      •Job-related work or homework.      •Working on the computer, social media, and texting.        •Avoid activities that could cause another head injury until your doctor says it is okay. This includes playing sports. Having another head injury, especially before the first one has healed, can be dangerous.      •Ask your doctor when it is safe for you to go back to your normal activities, such as work or school. Ask your doctor for a step-by-step plan for slowly going back to your normal activities.      •Ask your doctor when you can drive, ride a bicycle, or use heavy machinery. Do not do these activities if you are dizzy.        Lifestyle      • Do not drink alcohol until your doctor says it is okay.      • Do not use drugs.      •If it is harder than usual to remember things, write them down.      •If you are easily distracted, try to do one thing at a time.      •Talk with family members or close friends when making important decisions.      •Tell your friends, family, a trusted co-worker, and  about your injury, symptoms, and limits (restrictions). Have them watch for any problems that are new or getting worse.      General instructions     •Take over-the-counter and prescription medicines only as told by your doctor.      •Have someone stay with you for 24 hours after your head injury. This person should watch you for any changes in your symptoms and be ready to get help.      •Keep all follow-up visits as told by your doctor. This is important.      How is this prevented?     •Work on your balance and strength. This can help you avoid falls.      •Wear a seat belt when you are in a moving vehicle.    •Wear a helmet when you:  •Ride a bicycle.      •Ski.      •Do any other sport or activity that has a risk of injury.      •If you drink alcohol:•Limit how much you use to:  •0–1 drink a day for nonpregnant women.      •0–2 drinks a day for men.        •Be aware of how much alcohol is in your drink. In the [place] one drink equals one 12 oz bottle of beer (355 mL), one 5 oz glass of wine (148 mL), or one 1½ oz glass of hard liquor (44 mL).      •Make your home safer by:  •Getting rid of clutter from the floors and stairs. This includes things that can make you trip.      •Using grab bars in bathrooms and handrails by stairs.      •Placing non-slip mats on floors and in bathtubs.      •Putting more light in dim areas.          Where to find more information    •Centers for Disease Control and Prevention: www.cdc.gov        Get help right away if:  •You have:  •A very bad headache that is not helped by medicine.      •Trouble walking or weakness in your arms and legs.      •Clear or bloody fluid coming from your nose or ears.      •Changes in how you see (vision).      •A seizure.      •More confusion or more grumpy moods.        •Your symptoms get worse.      •You are sleepier than normal and have trouble staying awake.      •You lose your balance.      •The black centers of your eyes (pupils) change in size.      •Your speech is slurred.      •Your dizziness gets worse.      •You vomit.      These symptoms may be an emergency. Do not wait to see if the symptoms will go away. Get medical help right away. Call your local emergency services (911 in the [place] Do not drive yourself to the hospital.       Summary    •Head injuries can be as minor as a small bump. Some head injuries can be worse.      •Treatment for this condition depends on how severe the injury is and the type of injury you have.      •Have someone stay with you for 24 hours after your head injury.      •Ask your doctor when it is safe for you to go back to your normal activities, such as work or school.      •To prevent a head injury, wear a seat belt in a car, wear a helmet when you use a bicycle, limit your alcohol use, and make your home safer.      This information is not intended to replace advice given to you by your health care provider. Make sure you discuss any questions you have with your health care provider.    Staple Care    WHAT YOU NEED TO KNOW:    Staples are often used to close a wound. Your staples may be placed for 3 to 14 days, depending on the location of your wound.    DISCHARGE INSTRUCTIONS:    Care for your wound:   •Clean: ?You may be able to shower in 24 hours. Do not soak your wound under water.      ?Gently wash your wound with soap and warm water daily. Lightly pat it dry. Do not cover your wound unless your healthcare provider tells you to.       ?You may also need to clean your wound with a mixture of hydrogen peroxide and water. Ask how to do this.      ?Do not apply ointment or cream to the wound unless your healthcare provider tells you to.      •Elevate: ?Rest any arm or leg that has a wound on pillows above the level of your heart. Do this as often as possible for 2 days. This will help decrease swelling and pain, and help you heal faster.          •Minimize scarring: ?Avoid sunshine on your wound to reduce scarring.             Follow up with your healthcare provider as directed: You may need to return for a wound checkup 3 days after your staples are placed. Ask when you should return to get your staples removed.    Staple removal:   •A medical staple remover will be used to take out your staples. Your healthcare provider will slide the tool under each staple, squeeze the handle, and gently pull the staple out.       •Medical tape will be placed on your wound once your staples are removed. This will help keep your wound closed. The medical tape will fall off on its own after several days.       Contact your healthcare provider if:   •You have redness, pain, swelling, and pus draining from your wound.      •Your pain medicine does not relieve your pain.      •You have a fever of 101°F (38.5°C) or higher.      •You have an odor coming from your wound.      •You have questions or concerns about your condition or care.      Return to the emergency department if:   •Your wound reopens.      •You have red streaks in your skin that spread out from your wound.      •You have severe pain or vomiting.

## 2023-03-04 NOTE — ED PROVIDER NOTE - NS ED ATTENDING STATEMENT MOD
This was a shared visit with the LIZZIE. I reviewed and verified the documentation and independently performed the documented:

## 2023-03-15 ENCOUNTER — EMERGENCY (EMERGENCY)
Facility: HOSPITAL | Age: 63
LOS: 1 days | Discharge: HOME | End: 2023-03-15
Attending: EMERGENCY MEDICINE | Admitting: EMERGENCY MEDICINE
Payer: COMMERCIAL

## 2023-03-15 VITALS
TEMPERATURE: 98 F | WEIGHT: 238.98 LBS | HEIGHT: 63 IN | DIASTOLIC BLOOD PRESSURE: 86 MMHG | HEART RATE: 78 BPM | SYSTOLIC BLOOD PRESSURE: 132 MMHG | RESPIRATION RATE: 18 BRPM | OXYGEN SATURATION: 96 %

## 2023-03-15 DIAGNOSIS — Z98.891 HISTORY OF UTERINE SCAR FROM PREVIOUS SURGERY: Chronic | ICD-10-CM

## 2023-03-15 DIAGNOSIS — Z95.1 PRESENCE OF AORTOCORONARY BYPASS GRAFT: Chronic | ICD-10-CM

## 2023-03-15 DIAGNOSIS — E89.0 POSTPROCEDURAL HYPOTHYROIDISM: Chronic | ICD-10-CM

## 2023-03-15 PROCEDURE — L9995: CPT

## 2023-03-15 PROCEDURE — G0463: CPT

## 2023-03-15 RX ORDER — ASPIRIN/CALCIUM CARB/MAGNESIUM 324 MG
1 TABLET ORAL
Qty: 0 | Refills: 0 | DISCHARGE

## 2023-03-15 RX ORDER — ROSUVASTATIN CALCIUM 5 MG/1
1 TABLET ORAL
Qty: 0 | Refills: 0 | DISCHARGE

## 2023-03-15 RX ORDER — LEVOTHYROXINE SODIUM 125 MCG
1 TABLET ORAL
Qty: 0 | Refills: 0 | DISCHARGE

## 2023-03-15 RX ORDER — METFORMIN HYDROCHLORIDE 850 MG/1
1 TABLET ORAL
Qty: 0 | Refills: 0 | DISCHARGE

## 2023-03-15 NOTE — ED PROVIDER NOTE - PROGRESS NOTE DETAILS
Patient doing well status post removal, discussed patient regarding head injury precautions and instructions, wound precautions and instructions, bacitracin and infection precautions and importance of close prompt follow-up.

## 2023-03-15 NOTE — ED PROVIDER NOTE - CLINICAL SUMMARY MEDICAL DECISION MAKING FREE TEXT BOX
Mechanical fall 10 days ago, with head injury.  CT negative, neurologically intact, no signs of wound infection.  Topical antibiotics, staple removal, outpatient follow-up

## 2023-03-15 NOTE — ED PROVIDER NOTE - ENMT, MLM
Airway patent, Nasal mucosa clear. Mouth with normal mucosa. Throat has no vesicles, no oropharyngeal exudates and uvula is midline. no ext signs of acute head trauma. Pos 2cm lac to R parietal, no redness / swelling, no discharge, well approximated.

## 2023-03-15 NOTE — ED PROVIDER NOTE - CARE PLAN
1 Principal Discharge DX:	Visit for suture removal  Secondary Diagnosis:	Removal of staples  Secondary Diagnosis:	Head injury   Principal Discharge DX:	Encounter for removal of staples  Secondary Diagnosis:	Head injury

## 2023-03-15 NOTE — ED PROVIDER NOTE - NEUROLOGICAL, MLM
1. Continue with present meds.   2. Monitor your pressures at home and if they are persistently over 140 sysolic call me.   3. Return in march for repeat non fasting blood work.     
Alert and oriented, no focal deficits, no motor or sensory deficits.

## 2023-03-15 NOTE — ED ADULT NURSE NOTE - CAS TRG GENERAL NORM CIRC DET
February 26, 2021      Zack Santiago  [address]    [place] [address]        Dear ,    We are writing to inform you of your test results.    Your test results fall within the expected range(s) or remain unchanged from previous results.  Please continue with current treatment plan.    Resulted Orders   Basic metabolic panel  (Ca, Cl, CO2, Creat, Gluc, K, Na, BUN)   Result Value Ref Range    Sodium 140 133 - 144 mmol/L    Potassium 4.0 3.4 - 5.3 mmol/L    Chloride 108 94 - 109 mmol/L    Carbon Dioxide 25 20 - 32 mmol/L    Anion Gap 7 3 - 14 mmol/L    Glucose 75 70 - 99 mg/dL      Comment:      Non Fasting    Urea Nitrogen 33 (H) 7 - 30 mg/dL    Creatinine 1.29 (H) 0.66 - 1.25 mg/dL    GFR Estimate 49 (L) >60 mL/min/[1.73_m2]      Comment:      Non  GFR Calc  Starting 12/18/2018, serum creatinine based estimated GFR (eGFR) will be   calculated using the Chronic Kidney Disease Epidemiology Collaboration   (CKD-EPI) equation.      GFR Estimate If Black 56 (L) >60 mL/min/[1.73_m2]      Comment:       GFR Calc  Starting 12/18/2018, serum creatinine based estimated GFR (eGFR) will be   calculated using the Chronic Kidney Disease Epidemiology Collaboration   (CKD-EPI) equation.      Calcium 9.3 8.5 - 10.1 mg/dL   CBC with platelets and differential   Result Value Ref Range    WBC 5.8 4.0 - 11.0 10e9/L    RBC Count 3.67 (L) 4.4 - 5.9 10e12/L    Hemoglobin 12.0 (L) 13.3 - 17.7 g/dL    Hematocrit 37.4 (L) 40.0 - 53.0 %     (H) 78 - 100 fl    MCH 32.7 26.5 - 33.0 pg    MCHC 32.1 31.5 - 36.5 g/dL    RDW 15.1 (H) 10.0 - 15.0 %    Platelet Count 155 150 - 450 10e9/L    % Neutrophils 64.5 %    % Lymphocytes 20.3 %    % Monocytes 10.5 %    % Eosinophils 4.0 %    % Basophils 0.7 %    Absolute Neutrophil 3.8 1.6 - 8.3 10e9/L    Absolute Lymphocytes 1.2 0.8 - 5.3 10e9/L    Absolute Monocytes 0.6 0.0 - 1.3 10e9/L    Absolute Eosinophils 0.2 0.0 - 0.7 10e9/L    Absolute  Basophils 0.0 0.0 - 0.2 10e9/L    Diff Method Automated Method        If you have any questions or concerns, please call the clinic at the number listed above.       Sincerely,      DOMENIC Fernandez CNP    brendon     [facility]

## 2023-03-15 NOTE — ED PROVIDER NOTE - PATIENT PORTAL LINK FT
You can access the FollowMyHealth Patient Portal offered by [facility] by registering at the following website: [email or web address]. By joining [employer]’s FollowMyHealth portal, you will also be able to view your health information using other applications (apps) compatible with our system.

## 2023-03-15 NOTE — ED PROVIDER NOTE - OBJECTIVE STATEMENT
62-year-old female with history of hypertension, diabetes, hypothyroid, CAD status post CABG status post stents, on Plavix, no anticoagulant use presents with status post mechanical fall 10 days ago, hit the right side of her head with a laceration to her scalp.  Patient was seen in the ER, had a CT with no acute findings.  Patient has not been having any acute headaches.  No nausea or vomiting.  No numbness/tingling/focal weakness.  Minimal discomfort to the wound.  No redness or swelling.  No acute discharge from the wound.  No other acute injury or complaints.  Patient previously vaccinated for COVID, no recent exposures.

## 2023-03-15 NOTE — ED ADULT NURSE NOTE - CAS ELECT INFOMATION PROVIDED
pt explained how to care for wound and advised on signs of infection which include redness, swelling,fever, pus discharge and /or pain and if any symptoms occur to return to emergency room for evaluation and pt verbalized understanding/DC instructions

## 2023-03-15 NOTE — ED ADULT NURSE NOTE - INTERVENTIONS DEFINITIONS
[place] to call system/Room bathroom lighting operational/Non-slip footwear when patient is off stretcher/Review medications for side effects contributing to fall risk

## 2023-03-15 NOTE — ED ADULT NURSE NOTE - HOW OFTEN DO YOU HAVE SIX OR MORE DRINKS ON ONE OCCASION?
Detail Level: Zone Comment: Patient doing well has seen some nice improvement.   Discussed adding t sal for the scalp and to use topicals prn for breakthrough. Will continue taltz. Labs next nov today wnl and quant gold neg Never Render Risk Assessment In Note?: no

## 2023-03-15 NOTE — ED PROVIDER NOTE - NSFOLLOWUPINSTRUCTIONS_ED_ALL_ED_FT
1. Follow-up with your Primary Medical Doctor or referred doctor. Call today / next business day for prompt follow-up.  2. Return to Emergency room for any weakness, fever, any redness / swelling to wound, any discharge, increased pain to area, or any other concerning symptoms.  3. See attached instruction sheets for additional information, including information regarding signs and symptoms to look out for, reasons to seek immediate care and other important instructions.  4. Wound care as discussed

## 2023-03-15 NOTE — ED PROVIDER NOTE - MUSCULOSKELETAL, MLM
Spine appears normal, no spinal tend (c,t,l) , range of motion is not limited, no muscle or joint tenderness

## 2023-03-29 ENCOUNTER — APPOINTMENT (OUTPATIENT)
Dept: CARDIOLOGY | Facility: CLINIC | Age: 63
End: 2023-03-29

## 2023-10-17 NOTE — ED ADULT NURSE NOTE - CAS DISCH TRANSFER METHOD
Enter Query Response Below      Query Response: Severe sepsis with shock.    Electronically signed by Husam Rey MD, 10/17/23, 4:43 PM EDT.               If applicable, please update the problem list.      Private car

## 2024-03-05 NOTE — ED ADULT TRIAGE NOTE - HEART RATE (BEATS/MIN)
Health Maintenance Due   Topic Date Due    Pneumococcal Vaccine 0-64 (1 of 2 - PCV) 05/21/2007    Influenza Vaccine (1) 09/01/2023    COVID-19 Vaccine (2 - 2023-24 season) 09/01/2023       Patient is due for the topics as listed above and wishes to proceed with them.     Most Recent BARBIE 7 Score       BARBIE 7 Score BARBIE 7 Score   3/5/2024   3:00 PM 9    Recent PHQ 2/9 Score    PHQ 2:  PHQ 2 Score Adult PHQ 2 Score Adult PHQ 2 Interpretation Little interest or pleasure in activity?   3/5/2024   3:21 PM 1 No further screening needed 0       PHQ 9:  PHQ 9 Score Adult PHQ 9 Score Adult PHQ 9 Interpretation   3/5/2024   3:21 PM 7 Mild Depression      Vaccine Information Statement(s) or the Emergency Use Authorization was given today. This has been reviewed, questions answered, and verbal consent given by Patient for injection(s) and administration of COVID-19 Immunization  Spikevax , Influenza (Inactivated), and Pneumococcal Conjugate (PCV20).        Patient tolerated without incident. See immunization grid for documentation.    96

## 2024-03-11 RX ORDER — METOPROLOL SUCCINATE 100 MG/1
100 TABLET, EXTENDED RELEASE ORAL
Qty: 60 | Refills: 0 | Status: ACTIVE | COMMUNITY
Start: 2018-07-05

## 2024-05-23 RX ORDER — RANOLAZINE 1000 MG/1
1000 TABLET, EXTENDED RELEASE ORAL
Qty: 180 | Refills: 0 | Status: ACTIVE | COMMUNITY
Start: 2020-12-07 | End: 1900-01-01

## 2024-05-23 RX ORDER — LOSARTAN POTASSIUM 100 MG/1
100 TABLET, FILM COATED ORAL DAILY
Qty: 1 | Refills: 0 | Status: ACTIVE | COMMUNITY
Start: 2017-01-05 | End: 1900-01-01

## 2024-05-23 RX ORDER — CLOPIDOGREL BISULFATE 75 MG/1
75 TABLET, FILM COATED ORAL
Qty: 30 | Refills: 0 | Status: ACTIVE | COMMUNITY
Start: 2021-01-27 | End: 1900-01-01

## 2024-07-01 ENCOUNTER — NON-APPOINTMENT (OUTPATIENT)
Age: 64
End: 2024-07-01

## 2024-07-01 ENCOUNTER — APPOINTMENT (OUTPATIENT)
Dept: CARDIOLOGY | Facility: CLINIC | Age: 64
End: 2024-07-01
Payer: MEDICARE

## 2024-07-01 VITALS
BODY MASS INDEX: 34.38 KG/M2 | SYSTOLIC BLOOD PRESSURE: 141 MMHG | HEIGHT: 63 IN | WEIGHT: 194 LBS | HEART RATE: 85 BPM | DIASTOLIC BLOOD PRESSURE: 90 MMHG | OXYGEN SATURATION: 98 %

## 2024-07-01 VITALS — SYSTOLIC BLOOD PRESSURE: 140 MMHG | DIASTOLIC BLOOD PRESSURE: 86 MMHG

## 2024-07-01 DIAGNOSIS — I10 ESSENTIAL (PRIMARY) HYPERTENSION: ICD-10-CM

## 2024-07-01 DIAGNOSIS — I25.10 ATHEROSCLEROTIC HEART DISEASE OF NATIVE CORONARY ARTERY W/OUT ANGINA PECTORIS: ICD-10-CM

## 2024-07-01 DIAGNOSIS — E78.00 PURE HYPERCHOLESTEROLEMIA, UNSPECIFIED: ICD-10-CM

## 2024-07-01 PROCEDURE — 99204 OFFICE O/P NEW MOD 45 MIN: CPT

## 2024-07-01 PROCEDURE — 93000 ELECTROCARDIOGRAM COMPLETE: CPT

## 2024-07-01 PROCEDURE — G2211 COMPLEX E/M VISIT ADD ON: CPT

## 2024-07-17 ENCOUNTER — APPOINTMENT (OUTPATIENT)
Dept: CARDIOLOGY | Facility: CLINIC | Age: 64
End: 2024-07-17
Payer: MEDICARE

## 2024-07-17 PROCEDURE — 93880 EXTRACRANIAL BILAT STUDY: CPT

## 2024-07-17 PROCEDURE — 93306 TTE W/DOPPLER COMPLETE: CPT

## 2024-09-09 ENCOUNTER — NON-APPOINTMENT (OUTPATIENT)
Age: 64
End: 2024-09-09

## 2024-09-11 ENCOUNTER — APPOINTMENT (OUTPATIENT)
Dept: CARDIOLOGY | Facility: CLINIC | Age: 64
End: 2024-09-11

## 2024-09-18 ENCOUNTER — APPOINTMENT (OUTPATIENT)
Dept: CARDIOLOGY | Facility: CLINIC | Age: 64
End: 2024-09-18
Payer: MEDICARE

## 2024-09-18 PROCEDURE — A9500: CPT

## 2024-09-18 PROCEDURE — 78452 HT MUSCLE IMAGE SPECT MULT: CPT

## 2024-09-18 PROCEDURE — 93015 CV STRESS TEST SUPVJ I&R: CPT

## 2024-09-26 NOTE — ASU PATIENT PROFILE, ADULT - HARM RISK FACTORS
S/p resection, following with Colorectal surgery, CEA has come down, con't follow up and imaging as per specialist  Orders:    CBC and differential; Future    Comprehensive metabolic panel; Future    Lipid panel; Future    Iron Panel (Includes Ferritin, Iron Sat%, Iron, and TIBC); Future    Hemoglobin A1C; Future     no

## 2025-01-08 ENCOUNTER — APPOINTMENT (OUTPATIENT)
Dept: CARDIOLOGY | Facility: CLINIC | Age: 65
End: 2025-01-08
Payer: MEDICARE

## 2025-01-08 ENCOUNTER — NON-APPOINTMENT (OUTPATIENT)
Age: 65
End: 2025-01-08

## 2025-01-08 VITALS
OXYGEN SATURATION: 95 % | HEIGHT: 63 IN | BODY MASS INDEX: 36.14 KG/M2 | HEART RATE: 79 BPM | DIASTOLIC BLOOD PRESSURE: 86 MMHG | WEIGHT: 204 LBS | SYSTOLIC BLOOD PRESSURE: 128 MMHG

## 2025-01-08 DIAGNOSIS — E78.00 PURE HYPERCHOLESTEROLEMIA, UNSPECIFIED: ICD-10-CM

## 2025-01-08 DIAGNOSIS — R25.2 CRAMP AND SPASM: ICD-10-CM

## 2025-01-08 DIAGNOSIS — I10 ESSENTIAL (PRIMARY) HYPERTENSION: ICD-10-CM

## 2025-01-08 PROCEDURE — 93000 ELECTROCARDIOGRAM COMPLETE: CPT

## 2025-01-08 PROCEDURE — G2211 COMPLEX E/M VISIT ADD ON: CPT

## 2025-01-08 PROCEDURE — 99214 OFFICE O/P EST MOD 30 MIN: CPT

## 2025-06-04 ENCOUNTER — APPOINTMENT (OUTPATIENT)
Dept: CARDIOLOGY | Facility: CLINIC | Age: 65
End: 2025-06-04
Payer: MEDICARE

## 2025-06-04 ENCOUNTER — NON-APPOINTMENT (OUTPATIENT)
Age: 65
End: 2025-06-04

## 2025-06-04 VITALS
HEART RATE: 77 BPM | OXYGEN SATURATION: 97 % | SYSTOLIC BLOOD PRESSURE: 135 MMHG | DIASTOLIC BLOOD PRESSURE: 83 MMHG | HEIGHT: 63 IN | BODY MASS INDEX: 36.14 KG/M2 | WEIGHT: 204 LBS

## 2025-06-04 DIAGNOSIS — E78.00 PURE HYPERCHOLESTEROLEMIA, UNSPECIFIED: ICD-10-CM

## 2025-06-04 DIAGNOSIS — R93.89 ABNORMAL FINDINGS ON DIAGNOSTIC IMAGING OF OTHER SPECIFIED BODY STRUCTURES: ICD-10-CM

## 2025-06-04 DIAGNOSIS — I10 ESSENTIAL (PRIMARY) HYPERTENSION: ICD-10-CM

## 2025-06-04 PROCEDURE — 93000 ELECTROCARDIOGRAM COMPLETE: CPT

## 2025-06-04 PROCEDURE — G2211 COMPLEX E/M VISIT ADD ON: CPT

## 2025-06-04 PROCEDURE — 99214 OFFICE O/P EST MOD 30 MIN: CPT

## 2025-07-23 ENCOUNTER — APPOINTMENT (OUTPATIENT)
Dept: CARDIOLOGY | Facility: CLINIC | Age: 65
End: 2025-07-23
Payer: MEDICARE

## 2025-07-23 PROCEDURE — 93880 EXTRACRANIAL BILAT STUDY: CPT
